# Patient Record
Sex: MALE | Employment: OTHER | URBAN - METROPOLITAN AREA
[De-identification: names, ages, dates, MRNs, and addresses within clinical notes are randomized per-mention and may not be internally consistent; named-entity substitution may affect disease eponyms.]

---

## 2017-03-16 ENCOUNTER — HOSPITAL ENCOUNTER (OUTPATIENT)
Dept: RADIATION THERAPY | Age: 72
Discharge: HOME | End: 2017-03-16

## 2017-09-28 ENCOUNTER — HOSPITAL ENCOUNTER (OUTPATIENT)
Dept: RADIATION THERAPY | Age: 72
Discharge: HOME | End: 2017-09-28

## 2017-10-26 ENCOUNTER — HOSPITAL ENCOUNTER (OUTPATIENT)
Dept: RADIATION THERAPY | Age: 72
Discharge: HOME | End: 2017-10-26

## 2018-04-13 ENCOUNTER — HOSPITAL ENCOUNTER (OUTPATIENT)
Dept: RADIATION THERAPY | Age: 73
Discharge: HOME | End: 2018-04-13

## 2019-01-24 ENCOUNTER — HOSPITAL ENCOUNTER (OUTPATIENT)
Dept: RADIATION THERAPY | Age: 74
Discharge: HOME | End: 2019-01-24

## 2019-10-22 ENCOUNTER — HOSPITAL ENCOUNTER (OUTPATIENT)
Dept: RADIATION THERAPY | Age: 74
Discharge: HOME | End: 2019-10-22

## 2020-04-30 ENCOUNTER — HOSPITAL ENCOUNTER (OUTPATIENT)
Dept: RADIATION THERAPY | Age: 75
Discharge: HOME | End: 2020-04-30

## 2024-02-07 ENCOUNTER — HOSPITAL ENCOUNTER (EMERGENCY)
Facility: HOSPITAL | Age: 79
Discharge: HOME | End: 2024-02-07
Attending: EMERGENCY MEDICINE | Admitting: EMERGENCY MEDICINE
Payer: MEDICARE

## 2024-02-07 ENCOUNTER — APPOINTMENT (OUTPATIENT)
Facility: HOSPITAL | Age: 79
End: 2024-02-07
Payer: MEDICARE

## 2024-02-07 VITALS
WEIGHT: 235 LBS | HEIGHT: 70 IN | DIASTOLIC BLOOD PRESSURE: 81 MMHG | OXYGEN SATURATION: 98 % | BODY MASS INDEX: 33.64 KG/M2 | HEART RATE: 86 BPM | SYSTOLIC BLOOD PRESSURE: 151 MMHG | TEMPERATURE: 98.2 F | RESPIRATION RATE: 18 BRPM

## 2024-02-07 DIAGNOSIS — L03.116 CELLULITIS OF LEFT LOWER EXTREMITY: Primary | ICD-10-CM

## 2024-02-07 LAB — ECHO BSA: 2.29 M2

## 2024-02-07 PROCEDURE — 93971 EXTREMITY STUDY: CPT

## 2024-02-07 PROCEDURE — 99284 EMERGENCY DEPT VISIT MOD MDM: CPT

## 2024-02-07 RX ORDER — FAMOTIDINE 20 MG/1
20 TABLET, FILM COATED ORAL 2 TIMES DAILY
COMMUNITY

## 2024-02-07 RX ORDER — GABAPENTIN 300 MG/1
300 CAPSULE ORAL 3 TIMES DAILY
COMMUNITY

## 2024-02-07 RX ORDER — ACYCLOVIR 200 MG/1
CAPSULE ORAL
COMMUNITY

## 2024-02-07 RX ORDER — TRAZODONE HYDROCHLORIDE 50 MG/1
50 TABLET ORAL NIGHTLY
COMMUNITY

## 2024-02-07 RX ORDER — CEPHALEXIN 500 MG/1
500 CAPSULE ORAL 3 TIMES DAILY
Qty: 15 CAPSULE | Refills: 0 | Status: SHIPPED | OUTPATIENT
Start: 2024-02-07 | End: 2024-02-12

## 2024-02-07 RX ORDER — IBUPROFEN 800 MG/1
800 TABLET ORAL EVERY 8 HOURS PRN
Qty: 30 TABLET | Refills: 0 | Status: SHIPPED | OUTPATIENT
Start: 2024-02-07 | End: 2024-03-08

## 2024-02-07 RX ORDER — ACETAMINOPHEN 500 MG
1000 TABLET ORAL
Qty: 60 TABLET | Refills: 0 | Status: SHIPPED | OUTPATIENT
Start: 2024-02-07 | End: 2024-03-08

## 2024-02-07 RX ORDER — RIVAROXABAN 10 MG/1
10 TABLET, FILM COATED ORAL
COMMUNITY

## 2024-02-07 RX ORDER — LISINOPRIL 20 MG/1
20 TABLET ORAL DAILY
COMMUNITY

## 2024-02-07 ASSESSMENT — PAIN SCALES - GENERAL: PAINLEVEL_OUTOF10: 5

## 2024-02-07 ASSESSMENT — LIFESTYLE VARIABLES
HOW MANY STANDARD DRINKS CONTAINING ALCOHOL DO YOU HAVE ON A TYPICAL DAY: 1 OR 2
HOW OFTEN DO YOU HAVE A DRINK CONTAINING ALCOHOL: 2-4 TIMES A MONTH

## 2024-02-07 ASSESSMENT — PAIN DESCRIPTION - PAIN TYPE: TYPE: ACUTE PAIN

## 2024-02-07 ASSESSMENT — PAIN - FUNCTIONAL ASSESSMENT: PAIN_FUNCTIONAL_ASSESSMENT: 0-10

## 2024-02-07 ASSESSMENT — PAIN DESCRIPTION - LOCATION: LOCATION: LEG

## 2024-02-07 NOTE — ED TRIAGE NOTES
To ED with c/o pain and redness to left calf on set last night before bed.  Has a history of blood clots to left leg and is on Xarelto.  Recent history of Multiple Myeloma and is receiving chemotherapy.

## 2024-02-07 NOTE — ED PROVIDER NOTES
PCP    Schedule an appointment as soon as possible for a visit       [facility]  [address] [address] [address] [phone]  Schedule an appointment as soon as possible for a visit   As needed, To establish primary care      DISCHARGE MEDICATIONS:  Discharge Medication List as of 2/7/2024  8:10 PM        START taking these medications    Details   cephALEXin (KEFLEX) 500 MG capsule Take 1 capsule by mouth 3 times daily for 5 days, Disp-15 capsule, R-0Normal      acetaminophen (TYLENOL) 500 MG tablet Take 2 tablets by mouth every 6-8 hours as needed for Pain, Disp-60 tablet, R-0Normal      ibuprofen (ADVIL;MOTRIN) 800 MG tablet Take 1 tablet by mouth every 8 hours as needed for Pain, Disp-30 tablet, R-0Normal               (Please note that portions of this note were completed with a voice recognition program.  Efforts were made to edit the dictations but occasionally words are mis-transcribed.)    Sima Rhoades PA-C (electronically signed)  Emergency Attending Physician / Physician Assistant / Nurse Practitioner    Presentation, management, and disposition were discussed with the attending physician, Dr. Lazcano, who is in agreement with plan of care.               Sima Rhoades PA-C  02/07/24 2018

## 2024-02-08 NOTE — DISCHARGE INSTRUCTIONS
As discussed, your workup today is negative for any emergent findings. This is likely cellulitis (skin infection). There is no blood clot on US. Follow up with your PCP for further management. Return to the ER if you experience severe or worsening symptoms.

## 2024-02-08 NOTE — ED NOTES
Pt given discharge instructions, patient education, prescriptions, and follow up information. Pt verbalizes understanding. All questions answered. Patient discharged to home in private vehicle, ambulatory. Pt A&Ox4, RA, pain controlled.

## 2024-12-03 ENCOUNTER — HOSPITAL ENCOUNTER (OUTPATIENT)
Facility: HOSPITAL | Age: 79
Setting detail: SPECIMEN
Discharge: HOME | End: 2024-12-06

## 2025-03-23 ENCOUNTER — HOSPITAL ENCOUNTER (INPATIENT)
Facility: HOSPITAL | Age: 80
LOS: 3 days | Discharge: ANOTHER HOSPITAL | DRG: 840 | End: 2025-03-26
Attending: EMERGENCY MEDICINE | Admitting: STUDENT IN AN ORGANIZED HEALTH CARE EDUCATION/TRAINING PROGRAM
Payer: MEDICARE

## 2025-03-23 DIAGNOSIS — R53.1 GENERAL WEAKNESS: ICD-10-CM

## 2025-03-23 DIAGNOSIS — N39.0 URINARY TRACT INFECTION WITH HEMATURIA, SITE UNSPECIFIED: ICD-10-CM

## 2025-03-23 DIAGNOSIS — R31.9 URINARY TRACT INFECTION WITH HEMATURIA, SITE UNSPECIFIED: ICD-10-CM

## 2025-03-23 DIAGNOSIS — R33.9 URINARY RETENTION: Primary | ICD-10-CM

## 2025-03-23 LAB
ALBUMIN SERPL-MCNC: 2.8 G/DL (ref 3.5–5)
ALBUMIN/GLOB SERPL: 0.7 (ref 1.1–2.2)
ALP SERPL-CCNC: 104 U/L (ref 45–117)
ALT SERPL-CCNC: 38 U/L (ref 12–78)
ANION GAP SERPL CALC-SCNC: 7 MMOL/L (ref 2–12)
APPEARANCE UR: CLEAR
AST SERPL-CCNC: 37 U/L (ref 15–37)
BACTERIA URNS QL MICRO: NEGATIVE /HPF
BASOPHILS # BLD: 0.03 K/UL (ref 0–0.1)
BASOPHILS NFR BLD: 0.4 % (ref 0–1)
BILIRUB SERPL-MCNC: 0.6 MG/DL (ref 0.2–1)
BILIRUB UR QL: NEGATIVE
BUN SERPL-MCNC: 24 MG/DL (ref 6–20)
BUN/CREAT SERPL: 27 (ref 12–20)
CALCIUM SERPL-MCNC: 9.7 MG/DL (ref 8.5–10.1)
CHLORIDE SERPL-SCNC: 105 MMOL/L (ref 97–108)
CK SERPL-CCNC: 66 U/L (ref 39–308)
CO2 SERPL-SCNC: 26 MMOL/L (ref 21–32)
COLOR UR: ABNORMAL
CREAT SERPL-MCNC: 0.89 MG/DL (ref 0.7–1.3)
DIFFERENTIAL METHOD BLD: ABNORMAL
EOSINOPHIL # BLD: 0.32 K/UL (ref 0–0.4)
EOSINOPHIL NFR BLD: 3.8 % (ref 0–7)
EPITH CASTS URNS QL MICRO: ABNORMAL /LPF
ERYTHROCYTE [DISTWIDTH] IN BLOOD BY AUTOMATED COUNT: 13.8 % (ref 11.5–14.5)
ERYTHROCYTE [SEDIMENTATION RATE] IN BLOOD: 128 MM/HR (ref 0–20)
GLOBULIN SER CALC-MCNC: 4.3 G/DL (ref 2–4)
GLUCOSE SERPL-MCNC: 108 MG/DL (ref 65–100)
GLUCOSE UR STRIP.AUTO-MCNC: NEGATIVE MG/DL
HCT VFR BLD AUTO: 28.8 % (ref 36.6–50.3)
HGB BLD-MCNC: 9.2 G/DL (ref 12.1–17)
HGB UR QL STRIP: ABNORMAL
HYALINE CASTS URNS QL MICRO: ABNORMAL /LPF (ref 0–2)
IMM GRANULOCYTES # BLD AUTO: 0.05 K/UL (ref 0–0.04)
IMM GRANULOCYTES NFR BLD AUTO: 0.6 % (ref 0–0.5)
KETONES UR QL STRIP.AUTO: NEGATIVE MG/DL
LEUKOCYTE ESTERASE UR QL STRIP.AUTO: ABNORMAL
LYMPHOCYTES # BLD: 0.52 K/UL (ref 0.8–3.5)
LYMPHOCYTES NFR BLD: 6.2 % (ref 12–49)
MCH RBC QN AUTO: 30 PG (ref 26–34)
MCHC RBC AUTO-ENTMCNC: 31.9 G/DL (ref 30–36.5)
MCV RBC AUTO: 93.8 FL (ref 80–99)
MONOCYTES # BLD: 0.56 K/UL (ref 0–1)
MONOCYTES NFR BLD: 6.7 % (ref 5–13)
NEUTS SEG # BLD: 6.92 K/UL (ref 1.8–8)
NEUTS SEG NFR BLD: 82.3 % (ref 32–75)
NITRITE UR QL STRIP.AUTO: NEGATIVE
NRBC # BLD: 0 K/UL (ref 0–0.01)
NRBC BLD-RTO: 0 PER 100 WBC
PH UR STRIP: 6.5 (ref 5–8)
PLATELET # BLD AUTO: 324 K/UL (ref 150–400)
PMV BLD AUTO: 9 FL (ref 8.9–12.9)
POTASSIUM SERPL-SCNC: 4 MMOL/L (ref 3.5–5.1)
PROT SERPL-MCNC: 7.1 G/DL (ref 6.4–8.2)
PROT UR STRIP-MCNC: 100 MG/DL
RBC # BLD AUTO: 3.07 M/UL (ref 4.1–5.7)
RBC #/AREA URNS HPF: >100 /HPF (ref 0–5)
RBC MORPH BLD: ABNORMAL
SODIUM SERPL-SCNC: 138 MMOL/L (ref 136–145)
SP GR UR REFRACTOMETRY: 1.02 (ref 1–1.03)
SPECIMEN HOLD: NORMAL
UROBILINOGEN UR QL STRIP.AUTO: 1 EU/DL (ref 0.2–1)
WBC # BLD AUTO: 8.4 K/UL (ref 4.1–11.1)
WBC URNS QL MICRO: ABNORMAL /HPF (ref 0–4)

## 2025-03-23 PROCEDURE — 85025 COMPLETE CBC W/AUTO DIFF WBC: CPT

## 2025-03-23 PROCEDURE — 2500000003 HC RX 250 WO HCPCS: Performed by: INTERNAL MEDICINE

## 2025-03-23 PROCEDURE — 2580000003 HC RX 258: Performed by: INTERNAL MEDICINE

## 2025-03-23 PROCEDURE — 94761 N-INVAS EAR/PLS OXIMETRY MLT: CPT

## 2025-03-23 PROCEDURE — 51702 INSERT TEMP BLADDER CATH: CPT

## 2025-03-23 PROCEDURE — 82550 ASSAY OF CK (CPK): CPT

## 2025-03-23 PROCEDURE — 1100000000 HC RM PRIVATE

## 2025-03-23 PROCEDURE — 81001 URINALYSIS AUTO W/SCOPE: CPT

## 2025-03-23 PROCEDURE — 80053 COMPREHEN METABOLIC PANEL: CPT

## 2025-03-23 PROCEDURE — 6360000002 HC RX W HCPCS: Performed by: INTERNAL MEDICINE

## 2025-03-23 PROCEDURE — 36415 COLL VENOUS BLD VENIPUNCTURE: CPT

## 2025-03-23 PROCEDURE — G0378 HOSPITAL OBSERVATION PER HR: HCPCS

## 2025-03-23 PROCEDURE — 99285 EMERGENCY DEPT VISIT HI MDM: CPT

## 2025-03-23 PROCEDURE — 85652 RBC SED RATE AUTOMATED: CPT

## 2025-03-23 PROCEDURE — 6370000000 HC RX 637 (ALT 250 FOR IP): Performed by: INTERNAL MEDICINE

## 2025-03-23 RX ORDER — LISINOPRIL 20 MG/1
20 TABLET ORAL DAILY
Status: DISCONTINUED | OUTPATIENT
Start: 2025-03-24 | End: 2025-03-27 | Stop reason: HOSPADM

## 2025-03-23 RX ORDER — ACETAMINOPHEN 650 MG/1
650 SUPPOSITORY RECTAL EVERY 6 HOURS PRN
Status: DISCONTINUED | OUTPATIENT
Start: 2025-03-23 | End: 2025-03-27 | Stop reason: HOSPADM

## 2025-03-23 RX ORDER — ACYCLOVIR 200 MG/1
200 CAPSULE ORAL 2 TIMES DAILY
Status: DISCONTINUED | OUTPATIENT
Start: 2025-03-23 | End: 2025-03-27 | Stop reason: HOSPADM

## 2025-03-23 RX ORDER — ONDANSETRON 4 MG/1
4 TABLET, ORALLY DISINTEGRATING ORAL EVERY 8 HOURS PRN
Status: DISCONTINUED | OUTPATIENT
Start: 2025-03-23 | End: 2025-03-27 | Stop reason: HOSPADM

## 2025-03-23 RX ORDER — SODIUM CHLORIDE 0.9 % (FLUSH) 0.9 %
5-40 SYRINGE (ML) INJECTION PRN
Status: DISCONTINUED | OUTPATIENT
Start: 2025-03-23 | End: 2025-03-27 | Stop reason: HOSPADM

## 2025-03-23 RX ORDER — ACETAMINOPHEN 500 MG
45 TABLET ORAL 2 TIMES DAILY
COMMUNITY

## 2025-03-23 RX ORDER — GABAPENTIN 300 MG/1
300 CAPSULE ORAL 2 TIMES DAILY
Status: DISCONTINUED | OUTPATIENT
Start: 2025-03-23 | End: 2025-03-23

## 2025-03-23 RX ORDER — ROSUVASTATIN CALCIUM 10 MG/1
20 TABLET, COATED ORAL NIGHTLY
Status: DISCONTINUED | OUTPATIENT
Start: 2025-03-23 | End: 2025-03-27 | Stop reason: HOSPADM

## 2025-03-23 RX ORDER — OXYMETAZOLINE HYDROCHLORIDE 0.05 G/100ML
2 SPRAY NASAL 2 TIMES DAILY
COMMUNITY

## 2025-03-23 RX ORDER — GABAPENTIN 300 MG/1
300 CAPSULE ORAL DAILY
Status: DISCONTINUED | OUTPATIENT
Start: 2025-03-23 | End: 2025-03-27 | Stop reason: HOSPADM

## 2025-03-23 RX ORDER — ROSUVASTATIN CALCIUM 20 MG/1
20 TABLET, COATED ORAL NIGHTLY
COMMUNITY

## 2025-03-23 RX ORDER — FEXOFENADINE HCL 180 MG/1
180 TABLET ORAL DAILY PRN
COMMUNITY

## 2025-03-23 RX ORDER — POLYETHYLENE GLYCOL 3350 17 G/17G
17 POWDER, FOR SOLUTION ORAL DAILY PRN
Status: DISCONTINUED | OUTPATIENT
Start: 2025-03-23 | End: 2025-03-27 | Stop reason: HOSPADM

## 2025-03-23 RX ORDER — GUAIFENESIN 600 MG/1
1200 TABLET, EXTENDED RELEASE ORAL 2 TIMES DAILY
COMMUNITY

## 2025-03-23 RX ORDER — SODIUM CHLORIDE 9 MG/ML
INJECTION, SOLUTION INTRAVENOUS PRN
Status: DISCONTINUED | OUTPATIENT
Start: 2025-03-23 | End: 2025-03-27 | Stop reason: HOSPADM

## 2025-03-23 RX ORDER — SULFAMETHOXAZOLE AND TRIMETHOPRIM 800; 160 MG/1; MG/1
1 TABLET ORAL 2 TIMES DAILY
Qty: 14 TABLET | Refills: 0 | Status: SHIPPED | OUTPATIENT
Start: 2025-03-23 | End: 2025-03-30

## 2025-03-23 RX ORDER — SODIUM CHLORIDE 9 MG/ML
INJECTION, SOLUTION INTRAVENOUS CONTINUOUS
Status: DISCONTINUED | OUTPATIENT
Start: 2025-03-23 | End: 2025-03-24

## 2025-03-23 RX ORDER — ACETAMINOPHEN 325 MG/1
650 TABLET ORAL EVERY 6 HOURS PRN
Status: DISCONTINUED | OUTPATIENT
Start: 2025-03-23 | End: 2025-03-27 | Stop reason: HOSPADM

## 2025-03-23 RX ORDER — M-VIT,TX,IRON,MINS/CALC/FOLIC 27MG-0.4MG
1 TABLET ORAL 2 TIMES DAILY
COMMUNITY

## 2025-03-23 RX ORDER — ONDANSETRON 2 MG/ML
4 INJECTION INTRAMUSCULAR; INTRAVENOUS EVERY 6 HOURS PRN
Status: DISCONTINUED | OUTPATIENT
Start: 2025-03-23 | End: 2025-03-27 | Stop reason: HOSPADM

## 2025-03-23 RX ORDER — GABAPENTIN 300 MG/1
300 CAPSULE ORAL
Status: DISCONTINUED | OUTPATIENT
Start: 2025-03-23 | End: 2025-03-27 | Stop reason: HOSPADM

## 2025-03-23 RX ORDER — SODIUM CHLORIDE 0.9 % (FLUSH) 0.9 %
5-40 SYRINGE (ML) INJECTION EVERY 12 HOURS SCHEDULED
Status: DISCONTINUED | OUTPATIENT
Start: 2025-03-23 | End: 2025-03-27 | Stop reason: HOSPADM

## 2025-03-23 RX ADMIN — GABAPENTIN 300 MG: 300 CAPSULE ORAL at 17:42

## 2025-03-23 RX ADMIN — SODIUM CHLORIDE, PRESERVATIVE FREE 5 ML: 5 INJECTION INTRAVENOUS at 21:00

## 2025-03-23 RX ADMIN — ROSUVASTATIN CALCIUM 20 MG: 10 TABLET, FILM COATED ORAL at 20:59

## 2025-03-23 RX ADMIN — GABAPENTIN 400 MG: 300 CAPSULE ORAL at 20:59

## 2025-03-23 RX ADMIN — ACYCLOVIR 200 MG: 200 CAPSULE ORAL at 21:00

## 2025-03-23 RX ADMIN — WATER 1000 MG: 1 INJECTION INTRAMUSCULAR; INTRAVENOUS; SUBCUTANEOUS at 16:02

## 2025-03-23 RX ADMIN — SODIUM CHLORIDE: 0.9 INJECTION, SOLUTION INTRAVENOUS at 17:44

## 2025-03-23 RX ADMIN — SODIUM CHLORIDE: 0.9 INJECTION, SOLUTION INTRAVENOUS at 16:55

## 2025-03-23 ASSESSMENT — PAIN - FUNCTIONAL ASSESSMENT: PAIN_FUNCTIONAL_ASSESSMENT: NONE - DENIES PAIN

## 2025-03-23 ASSESSMENT — LIFESTYLE VARIABLES
HOW MANY STANDARD DRINKS CONTAINING ALCOHOL DO YOU HAVE ON A TYPICAL DAY: PATIENT DOES NOT DRINK
HOW OFTEN DO YOU HAVE A DRINK CONTAINING ALCOHOL: NEVER

## 2025-03-23 NOTE — CONSULTS
New Urology Consult Note    Patient: Edil Wong MRN: [id number]  SSN: [id number]    YOB: 1945  Age: 79 y.o.  Sex: male            Assessment:     Edil Wong is a 79 y.o. male with history of prostate cancer status post prostatectomy and radiation in 2015, recent hx of gross hematuria requiring cysto, clot evac and fulguration on 3/21/25 at [facility].  Presented to ED with complaints of uncontrolled incontinence and weakness.     On assessment nad, patient reports uncontrolled incontinence and bladder spasms prompting ED visit. Bladder spasms have resolved following FC placement. Denies gross hematuria, dysuria,flank pain or difficulty voiding. FC in place, clear mildly pink tinged urine noted in proximal tubing.    VSS, afebrile  No leukocytosis, cr- 0.89, hgb- 9.2  UA not c/w UTI, + 100 RBCs  No  imaging to review      Recommendations:     Gross Hematuria  Incontinence  Clear, mildly pink tinged urine on exam, no indications for intervention  - maintain FC, hydrate, hold AC if hematuria worsens  - discussed maintaining FC at DC to assist with incontinence vs incontinence wear, with further eval on outpatient basis. Will decide timing of VT inpatient vs outpatient closer to DC.    Will see in am     Thank you for this consult. Please contact [facility] with any further questions/concerns.             Subjective     Past Medical History  No past medical history on file.    Past Surgical History:   No past surgical history on file.    Medication:  Current Facility-Administered Medications   Medication Dose Route Frequency Provider Last Rate Last Admin    cefTRIAXone (ROCEPHIN) 1,000 mg in sterile water 10 mL IV syringe  1,000 mg IntraVENous Q24H Aniya Florence MD   1,000 mg at 03/23/25 1602    sodium chloride flush 0.9 % injection 5-40 mL  5-40 mL IntraVENous 2 times per day Aniya Florence MD        sodium chloride flush 0.9 % injection 5-40 mL

## 2025-03-23 NOTE — ED PROVIDER NOTES
Course User Index  [KR] Amparo Jackman, APRN - NP         CONSULTS:  None    PROCEDURES:     Procedures    Labs Reviewed   CBC WITH AUTO DIFFERENTIAL - Abnormal; Notable for the following components:       Result Value    RBC 3.07 (*)     Hemoglobin 9.2 (*)     Hematocrit 28.8 (*)     Neutrophils % 82.3 (*)     Lymphocytes % 6.2 (*)     Immature Granulocytes % 0.6 (*)     Lymphocytes Absolute 0.52 (*)     Immature Granulocytes Absolute 0.05 (*)     All other components within normal limits   COMPREHENSIVE METABOLIC PANEL - Abnormal; Notable for the following components:    Glucose 108 (*)     BUN 24 (*)     BUN/Creatinine Ratio 27 (*)     Albumin 2.8 (*)     Globulin 4.3 (*)     Albumin/Globulin Ratio 0.7 (*)     All other components within normal limits   URINALYSIS WITH MICROSCOPIC - Abnormal; Notable for the following components:    Protein,  (*)     Blood, Urine LARGE (*)     Leukocyte Esterase, Urine SMALL (*)     RBC, UA >100 (*)     All other components within normal limits   URINE CULTURE HOLD SAMPLE     No orders to display     Perfect Serve Consult for Admission  2:36 PM    ED Room Number: ER21/21  Patient Name and age:  Edil Wong 79 y.o.  male  Working Diagnosis:   1. Urinary retention    2. Urinary tract infection with hematuria, site unspecified    3. General weakness        COVID-19 Suspicion: No  Sepsis present:  No  Reassessment needed: No  Code Status:  Full Code  Readmission: No  Isolation Requirements: no  Recommended Level of Care: telemetry  Department: [facility] - [phone]    Other:  This is an 79-year-old male who presents to the emergency room with complaints of urinary incontinence.  He is escorted by his daughter.  Patient had a recent hospitalization to [facility] due to urinary incontinence and questionable obstruction.  Had a Carr catheter placed and had a procedure by urology to irrigate the Carr secondary to blood clots.  Daughter is unsure the name of the  procedure.  Patient was discharged to home on Friday but had his Carr catheter removed 30 minutes prior to leaving the hospital.  Since then he has been placed on p.o. antibiotics of which she has not taken as of yet.  Unsure what the antibiotics name is.  Patient with no other symptoms including chest pain, nausea or vomiting, fevers or chills.  No shortness of breath or dizziness. They did speak with urology who states to come to the emergency room with concerns for obstruction versus infection.  Of note patient has increased fatigue and weakness.       (Please note that portions of this note were completed with a voice recognition program.  Efforts were made to edit the dictations but occasionally words are mis-transcribed.)             Amparo Jackman, APRN - NP  03/23/25 [id number]

## 2025-03-23 NOTE — ED TRIAGE NOTES
Pt arrives to ED via POV with c/o urinary incontinence, urgency, and frequency for 2-3 days. Pt recently admitted to [facility] for blood in urine and incontinence. Had tesfaye placed and procedure to irrigate tesfaye due to blood clots and had hgb drop. Tesfaye removed yesterday and symptoms began after D/C.    Endorses weakness and feeling uncoordinated    MARIA DEL CARMEN Jackman in triage

## 2025-03-23 NOTE — H&P
[facility]  [address] [place]  [address] [phone]    Admission History and Physical      NAME:  Edil Wong   :   1945   MRN:  [id number]     PCP:  None, None     Date/Time:  3/23/2025         Subjective:     CHIEF COMPLAINT: \"I'm so weak\"     HISTORY OF PRESENT ILLNESS:     Mr. Wong is a 79 y.o.   male with PMH of HTN, XOL and neuropathy admitted for weakness and urinary discomfort. Per pt was recently hospitalized at [facility] for hematuria and required CBI. Reportedly discharged prior to VT completion. Daughter also noted some weakness there but did not complete PT/OT eval prior to d/c and previously has been limited by CBI    Pt denies back pain, saddle anesthesia    PMH  HTN  XOL     No past surgical history on file.    Social History     Tobacco Use    Smoking status: Former     Types: Cigarettes    Smokeless tobacco: Not on file   Substance Use Topics    Alcohol use: Not on file      FH   XOL    No Known Allergies     Prior to Admission medications    Medication Sig Start Date End Date Taking? Authorizing Provider   sulfamethoxazole-trimethoprim (BACTRIM DS;SEPTRA DS) 800-160 MG per tablet Take 1 tablet by mouth 2 times daily for 7 days 3/23/25 3/30/25 Yes Amparo Jackman APRN - NP   oxymetazoline (AFRIN) 0.05 % nasal spray 2 sprays by Nasal route 2 times daily   Yes Brian Parker MD   Ferrous Sulfate Dried ER (SLOW RELEASE IRON) 45 MG TBCR Take 45 mg by mouth in the morning and at bedtime   Yes Brian Parker MD   Multiple Vitamins-Minerals (THERAPEUTIC MULTIVITAMIN-MINERALS) tablet Take 1 tablet by mouth 2 times daily   Yes Brian Parker MD   rosuvastatin (CRESTOR) 20 MG tablet Take 1 tablet by mouth nightly   Yes Brian Parker MD   melatonin 3 MG TABS tablet Take 1 tablet by mouth nightly   Yes Brian Parker MD   fexofenadine (ALLEGRA) 180 MG tablet Take 1 tablet by mouth daily as needed   Yes

## 2025-03-24 LAB
ANION GAP SERPL CALC-SCNC: 7 MMOL/L (ref 2–12)
BASOPHILS # BLD: 0.03 K/UL (ref 0–0.1)
BASOPHILS NFR BLD: 0.6 % (ref 0–1)
BUN SERPL-MCNC: 18 MG/DL (ref 6–20)
BUN/CREAT SERPL: 27 (ref 12–20)
CALCIUM SERPL-MCNC: 8.7 MG/DL (ref 8.5–10.1)
CHLORIDE SERPL-SCNC: 110 MMOL/L (ref 97–108)
CO2 SERPL-SCNC: 24 MMOL/L (ref 21–32)
CREAT SERPL-MCNC: 0.66 MG/DL (ref 0.7–1.3)
DIFFERENTIAL METHOD BLD: ABNORMAL
EOSINOPHIL # BLD: 0.27 K/UL (ref 0–0.4)
EOSINOPHIL NFR BLD: 5.1 % (ref 0–7)
ERYTHROCYTE [DISTWIDTH] IN BLOOD BY AUTOMATED COUNT: 13.8 % (ref 11.5–14.5)
FERRITIN SERPL-MCNC: 789 NG/ML (ref 26–388)
GLUCOSE SERPL-MCNC: 91 MG/DL (ref 65–100)
HCT VFR BLD AUTO: 25.1 % (ref 36.6–50.3)
HEMOCCULT STL QL: NEGATIVE
HGB BLD-MCNC: 8 G/DL (ref 12.1–17)
IMM GRANULOCYTES # BLD AUTO: 0.05 K/UL (ref 0–0.04)
IMM GRANULOCYTES NFR BLD AUTO: 0.9 % (ref 0–0.5)
IRON SATN MFR SERPL: 22 % (ref 20–50)
IRON SERPL-MCNC: 58 UG/DL (ref 35–150)
LYMPHOCYTES # BLD: 0.7 K/UL (ref 0.8–3.5)
LYMPHOCYTES NFR BLD: 13.3 % (ref 12–49)
MAGNESIUM SERPL-MCNC: 2.1 MG/DL (ref 1.6–2.4)
MCH RBC QN AUTO: 30.3 PG (ref 26–34)
MCHC RBC AUTO-ENTMCNC: 31.9 G/DL (ref 30–36.5)
MCV RBC AUTO: 95.1 FL (ref 80–99)
MONOCYTES # BLD: 0.54 K/UL (ref 0–1)
MONOCYTES NFR BLD: 10.2 % (ref 5–13)
NEUTS SEG # BLD: 3.68 K/UL (ref 1.8–8)
NEUTS SEG NFR BLD: 69.9 % (ref 32–75)
NRBC # BLD: 0 K/UL (ref 0–0.01)
NRBC BLD-RTO: 0 PER 100 WBC
PLATELET # BLD AUTO: 276 K/UL (ref 150–400)
PMV BLD AUTO: 9 FL (ref 8.9–12.9)
POTASSIUM SERPL-SCNC: 4 MMOL/L (ref 3.5–5.1)
RBC # BLD AUTO: 2.64 M/UL (ref 4.1–5.7)
SODIUM SERPL-SCNC: 141 MMOL/L (ref 136–145)
TIBC SERPL-MCNC: 264 UG/DL (ref 250–450)
WBC # BLD AUTO: 5.3 K/UL (ref 4.1–11.1)

## 2025-03-24 PROCEDURE — 94761 N-INVAS EAR/PLS OXIMETRY MLT: CPT

## 2025-03-24 PROCEDURE — 97165 OT EVAL LOW COMPLEX 30 MIN: CPT

## 2025-03-24 PROCEDURE — 2500000003 HC RX 250 WO HCPCS: Performed by: INTERNAL MEDICINE

## 2025-03-24 PROCEDURE — 83735 ASSAY OF MAGNESIUM: CPT

## 2025-03-24 PROCEDURE — 85025 COMPLETE CBC W/AUTO DIFF WBC: CPT

## 2025-03-24 PROCEDURE — 82728 ASSAY OF FERRITIN: CPT

## 2025-03-24 PROCEDURE — 1100000000 HC RM PRIVATE

## 2025-03-24 PROCEDURE — 97162 PT EVAL MOD COMPLEX 30 MIN: CPT

## 2025-03-24 PROCEDURE — 6370000000 HC RX 637 (ALT 250 FOR IP): Performed by: INTERNAL MEDICINE

## 2025-03-24 PROCEDURE — 97535 SELF CARE MNGMENT TRAINING: CPT

## 2025-03-24 PROCEDURE — G0378 HOSPITAL OBSERVATION PER HR: HCPCS

## 2025-03-24 PROCEDURE — 36415 COLL VENOUS BLD VENIPUNCTURE: CPT

## 2025-03-24 PROCEDURE — 86235 NUCLEAR ANTIGEN ANTIBODY: CPT

## 2025-03-24 PROCEDURE — 97116 GAIT TRAINING THERAPY: CPT

## 2025-03-24 PROCEDURE — 97530 THERAPEUTIC ACTIVITIES: CPT

## 2025-03-24 PROCEDURE — 82272 OCCULT BLD FECES 1-3 TESTS: CPT

## 2025-03-24 PROCEDURE — 2580000003 HC RX 258: Performed by: INTERNAL MEDICINE

## 2025-03-24 PROCEDURE — 83550 IRON BINDING TEST: CPT

## 2025-03-24 PROCEDURE — 83540 ASSAY OF IRON: CPT

## 2025-03-24 PROCEDURE — 80048 BASIC METABOLIC PNL TOTAL CA: CPT

## 2025-03-24 PROCEDURE — 6360000002 HC RX W HCPCS: Performed by: INTERNAL MEDICINE

## 2025-03-24 RX ORDER — SODIUM CHLORIDE 9 MG/ML
INJECTION, SOLUTION INTRAVENOUS CONTINUOUS
Status: DISCONTINUED | OUTPATIENT
Start: 2025-03-24 | End: 2025-03-24

## 2025-03-24 RX ORDER — CIPROFLOXACIN 500 MG/1
500 TABLET, FILM COATED ORAL 2 TIMES DAILY
Status: DISCONTINUED | OUTPATIENT
Start: 2025-03-24 | End: 2025-03-27 | Stop reason: HOSPADM

## 2025-03-24 RX ADMIN — ROSUVASTATIN CALCIUM 20 MG: 10 TABLET, FILM COATED ORAL at 22:08

## 2025-03-24 RX ADMIN — ACETAMINOPHEN 650 MG: 325 TABLET ORAL at 09:35

## 2025-03-24 RX ADMIN — SODIUM CHLORIDE: 0.9 INJECTION, SOLUTION INTRAVENOUS at 06:01

## 2025-03-24 RX ADMIN — GABAPENTIN 300 MG: 300 CAPSULE ORAL at 13:12

## 2025-03-24 RX ADMIN — GABAPENTIN 400 MG: 300 CAPSULE ORAL at 22:08

## 2025-03-24 RX ADMIN — CIPROFLOXACIN HYDROCHLORIDE 500 MG: 500 TABLET, FILM COATED ORAL at 22:08

## 2025-03-24 RX ADMIN — IRON SUCROSE 200 MG: 20 INJECTION, SOLUTION INTRAVENOUS at 13:12

## 2025-03-24 RX ADMIN — GABAPENTIN 300 MG: 300 CAPSULE ORAL at 09:29

## 2025-03-24 RX ADMIN — SODIUM CHLORIDE, PRESERVATIVE FREE 10 ML: 5 INJECTION INTRAVENOUS at 22:08

## 2025-03-24 RX ADMIN — ACYCLOVIR 200 MG: 200 CAPSULE ORAL at 22:08

## 2025-03-24 RX ADMIN — CIPROFLOXACIN HYDROCHLORIDE 500 MG: 500 TABLET, FILM COATED ORAL at 13:13

## 2025-03-24 RX ADMIN — ACYCLOVIR 200 MG: 200 CAPSULE ORAL at 09:29

## 2025-03-24 ASSESSMENT — PAIN DESCRIPTION - LOCATION: LOCATION: OTHER (COMMENT)

## 2025-03-24 ASSESSMENT — PAIN SCALES - GENERAL: PAINLEVEL_OUTOF10: 5

## 2025-03-24 ASSESSMENT — PAIN DESCRIPTION - DESCRIPTORS: DESCRIPTORS: SORE

## 2025-03-24 ASSESSMENT — PAIN DESCRIPTION - ORIENTATION: ORIENTATION: LEFT

## 2025-03-24 NOTE — PROGRESS NOTES
Eosinophils % 3.8 0.0 - 7.0 %    Basophils % 0.4 0.0 - 1.0 %    Immature Granulocytes % 0.6 (H) 0.0 - 0.5 %    Neutrophils Absolute 6.92 1.80 - 8.00 K/UL    Lymphocytes Absolute 0.52 (L) 0.80 - 3.50 K/UL    Monocytes Absolute 0.56 0.00 - 1.00 K/UL    Eosinophils Absolute 0.32 0.00 - 0.40 K/UL    Basophils Absolute 0.03 0.00 - 0.10 K/UL    Immature Granulocytes Absolute 0.05 (H) 0.00 - 0.04 K/UL    Differential Type SMEAR SCANNED      RBC Comment NORMOCYTIC, NORMOCHROMIC     Comprehensive Metabolic Panel    Collection Time: 03/23/25 11:11 AM   Result Value Ref Range    Sodium 138 136 - 145 mmol/L    Potassium 4.0 3.5 - 5.1 mmol/L    Chloride 105 97 - 108 mmol/L    CO2 26 21 - 32 mmol/L    Anion Gap 7 2 - 12 mmol/L    Glucose 108 (H) 65 - 100 mg/dL    BUN 24 (H) 6 - 20 MG/DL    Creatinine 0.89 0.70 - 1.30 MG/DL    BUN/Creatinine Ratio 27 (H) 12 - 20      Est, Glom Filt Rate 86 >60 ml/min/1.73m2    Calcium 9.7 8.5 - 10.1 MG/DL    Total Bilirubin 0.6 0.2 - 1.0 MG/DL    ALT 38 12 - 78 U/L    AST 37 15 - 37 U/L    Alk Phosphatase 104 45 - 117 U/L    Total Protein 7.1 6.4 - 8.2 g/dL    Albumin 2.8 (L) 3.5 - 5.0 g/dL    Globulin 4.3 (H) 2.0 - 4.0 g/dL    Albumin/Globulin Ratio 0.7 (L) 1.1 - 2.2     Urinalysis with Microscopic    Collection Time: 03/23/25 11:56 AM   Result Value Ref Range    Color, UA YELLOW/STRAW      Appearance CLEAR CLEAR      Specific Gravity, UA 1.017 1.003 - 1.030      pH, Urine 6.5 5.0 - 8.0      Protein,  (A) NEG mg/dL    Glucose, Ur Negative NEG mg/dL    Ketones, Urine Negative NEG mg/dL    Bilirubin, Urine Negative NEG      Blood, Urine LARGE (A) NEG      Urobilinogen, Urine 1.0 0.2 - 1.0 EU/dL    Nitrite, Urine Negative NEG      Leukocyte Esterase, Urine SMALL (A) NEG      WBC, UA 10-20 0 - 4 /hpf    RBC, UA >100 (H) 0 - 5 /hpf    Epithelial Cells, UA FEW FEW /lpf    BACTERIA, URINE Negative NEG /hpf    Hyaline Casts, UA 2-5 0 - 2 /lpf   Urine Culture Hold Sample    Collection Time: 03/23/25  Time: 03/24/25  5:58 AM   Result Value Ref Range    Magnesium 2.1 1.6 - 2.4 mg/dL       Imaging:  No results found.    No results found.    No orders to display         Signed By: MOHIT Alxeis - MARIA DEL CARMEN - March 24, 2025

## 2025-03-24 NOTE — PROGRESS NOTES
New patient PCP appointment set for Monday May 19th 2025 at 3:30 PM with Doctor Case at their [place] location. Office asks for patient to arrive 15 mins early,bring picture ID/Insurance cards and list of current medications.

## 2025-03-24 NOTE — PLAN OF CARE
Problem: Occupational Therapy - Adult  Goal: By Discharge: Performs self-care activities at highest level of function for planned discharge setting.  See evaluation for individualized goals.  Description: FUNCTIONAL STATUS PRIOR TO ADMISSION:  Pt was independent and active.  Ambulated with no AD and still driving.  Receives Help From: Family, Neighbor, Prior Level of Assist for ADLs: Independent,  ,  ,  ,  ,  , Prior Level of Assist for Homemaking: Independent,  , Prior Level of Assist for Transfers: Independent, Active : Yes     HOME SUPPORT: Patient lived alone with lots of supportive family in the area.    Occupational Therapy Goals:  Initiated 3/24/2025  1.  Patient will perform lower body dressing with Supervision within 7 day(s).  2.  Patient will perform bathing with Stand by Assist within 7 day(s).  3.  Patient will perform grooming standing at sink with Contact Guard Assist within 7 day(s).  4.  Patient will perform toilet transfers with Supervision  within 7 day(s).  5.  Patient will perform all aspects of toileting with Supervision within 7 day(s).  6.  Patient will participate in upper extremity therapeutic exercise/activities with Supervision for 10 minutes within 7 day(s).    7.  Patient will utilize energy conservation techniques during functional activities with verbal cues within 7 day(s).   Outcome: Progressing   OCCUPATIONAL THERAPY EVALUATION    Patient: Edil Wong (79 y.o. male)  Date: 3/24/2025  Primary Diagnosis: Urinary retention [R33.9]  UTI (urinary tract infection) [N39.0]  General weakness [R53.1]  Urinary tract infection with hematuria, site unspecified [N39.0, R31.9]         Precautions: Fall Risk                  ASSESSMENT :  The patient is limited by decreased functional mobility, independence in ADLs, high-level IADLs, strength, body mechanics, activity tolerance, endurance, safety awareness, balance and fear of falling.    Based on the impairments listed above pt  activated    COMMUNICATION/EDUCATION:   The patient's plan of care was discussed with: physical therapist    Patient Education  Education Given To: Patient;Family  Education Provided: Role of Therapy;Plan of Care;Precautions;Transfer Training;Fall Prevention Strategies;Mobility Training  Education Method: Verbal;Demonstration  Barriers to Learning: None  Education Outcome: Verbalized understanding;Demonstrated understanding;Continued education needed    Thank you for this referral.  Alina Gonzalez OT  Minutes: 26    Occupational Therapy Evaluation Charge Determination   History Examination Decision-Making   LOW Complexity : Brief history review  LOW Complexity LOW Complexity: No comorbidities that affect functional and  no verbal  or physical assist needed to complete eval tasks   Based on the above components, the patient evaluation is determined to be of the following complexity level: Low

## 2025-03-24 NOTE — PROGRESS NOTES
[facility]  [address] [place] [address] [phone]        Hospitalist Progress Note      NAME: Edil Wong   :  1945  MRM:  [id number]    Date/Time: 3/24/2025  1:38 PM         Subjective:     Chief Complaint:  \"I'm okay\"     Pt seen and examined. Carr in place. Urine appears yellow. No complaints. Plan discussed with family. PT/OT recommending IPR     ROS:  (bold if positive, if negative)            Objective:       Vitals:          Last 24hrs VS reviewed since prior progress note. Most recent are:    Vitals:    25 0902   BP: 119/71   Pulse: 82   Resp: 16   Temp: 98.4 °F (36.9 °C)   SpO2: 96%     SpO2 Readings from Last 6 Encounters:   25 96%   24 98%          Intake/Output Summary (Last 24 hours) at 3/24/2025 1338  Last data filed at 3/24/2025 0326  Gross per 24 hour   Intake 1763.36 ml   Output 1900 ml   Net -136.64 ml          Exam:     Physical Exam:    Gen:  Well-developed, well-nourished, in no acute distress  HEENT:  Pink conjunctivae, PERRL, hearing intact to voice, moist mucous membranes  Neck:  Supple, without masses, thyroid non-tender  Resp:  No accessory muscle use, clear breath sounds without wheezes rales or rhonchi  Card:  No murmurs, normal S1, S2 without thrills, bruits or peripheral edema  Abd:  Soft, non-tender, non-distended, normoactive bowel sounds are present  Musc:  No cyanosis or clubbing  Skin:  No rashes or ulcers, skin turgor is good  Neuro:  Cranial nerves 3-12 are grossly intact,  strength is 5/5 bilaterally and dorsi / plantarflexion is 5/5 bilaterally, follows commands appropriately  Psych:  Good insight, oriented to person, place and time, alert      Medications Reviewed: (see below)    Lab Data Reviewed: (see below)    ______________________________________________________________________    Medications:     Current Facility-Administered Medications   Medication Dose Route Frequency    ciprofloxacin  (CIPRO) tablet 500 mg  500 mg Oral BID    iron sucrose (VENOFER) injection 200 mg  200 mg IntraVENous Daily    sodium chloride flush 0.9 % injection 5-40 mL  5-40 mL IntraVENous 2 times per day    sodium chloride flush 0.9 % injection 5-40 mL  5-40 mL IntraVENous PRN    0.9 % sodium chloride infusion   IntraVENous PRN    ondansetron (ZOFRAN-ODT) disintegrating tablet 4 mg  4 mg Oral Q8H PRN    Or    ondansetron (ZOFRAN) injection 4 mg  4 mg IntraVENous Q6H PRN    polyethylene glycol (GLYCOLAX) packet 17 g  17 g Oral Daily PRN    acetaminophen (TYLENOL) tablet 650 mg  650 mg Oral Q6H PRN    Or    acetaminophen (TYLENOL) suppository 650 mg  650 mg Rectal Q6H PRN    0.9 % sodium chloride infusion   IntraVENous Continuous    rosuvastatin (CRESTOR) tablet 20 mg  20 mg Oral Nightly    [Held by provider] lisinopril (PRINIVIL;ZESTRIL) tablet 20 mg  20 mg Oral Daily    acyclovir (ZOVIRAX) capsule 200 mg  200 mg Oral BID    gabapentin (NEURONTIN) capsule 300 mg  300 mg Oral Daily    gabapentin (NEURONTIN) capsule 300 mg  300 mg Oral Lunch    gabapentin (NEURONTIN) capsule 400 mg  400 mg Oral Nightly            Lab Review:     Recent Labs     03/23/25  1111 03/24/25  0558   WBC 8.4 5.3   HGB 9.2* 8.0*   HCT 28.8* 25.1*    276     Recent Labs     03/23/25  1111 03/24/25  0558    141   K 4.0 4.0    110*   CO2 26 24   BUN 24* 18   MG  --  2.1   ALT 38  --      No components found for: \"GLPOC\"         Assessment / Plan:   Mr. Wong is a 78 yo male with PMH of HTN, XOL admitted with recurrent hematuria and weakness     Hematuria - resolved   -monitor with tesfaye in place   -holding NOAC (this was stopped in Dec per pt)      UTI (urinary tract infection) - per urology prior cultures with pseudomonas  -change antibiotics to cipro        Urinary retention - mild. Bladder scan in the ED was inconclusive but post cath insertion 400 out so was retaining  -VT in one week      Anemia - likely PIERRE   -s/p IV iron x

## 2025-03-24 NOTE — PROGRESS NOTES
NUTRITION    Best practice alert was triggered based on results obtained during nursing admission assessment for Weight loss 14-23#     Wt Readings from Last 30 Encounters:   03/23/25 100.7 kg (222 lb)   02/07/24 106.6 kg (235 lb)        Wt loss of 13 lbs over 13 months. Not significant for time frame. Current weight is pt stated. Please obtain measured weight.     Meal Intake:   Patient Vitals for the past 168 hrs:   PO Meals Eaten (%)   03/23/25 1758 76 - 100%     Supplement Intake:  No data found.      The patient's chart was reviewed and nutrition assessment is not indicated at this time.  Plan to see patient for rescreen per policy.  Thank you.       Ephraim Arredondo RD  Ext: [phone], or via [employer]

## 2025-03-24 NOTE — PLAN OF CARE
Problem: Physical Therapy - Adult  Goal: By Discharge: Performs mobility at highest level of function for planned discharge setting.  See evaluation for individualized goals.  Description: FUNCTIONAL STATUS PRIOR TO ADMISSION: Patient was independent and active without use of DME.  He continues to drive and lives alone    HOME SUPPORT PRIOR TO ADMISSION: The patient lived alone with son/dtr and family to provide assistance.    Physical Therapy Goals  Initiated 3/24/2025  1.  Patient will move from supine to sit and sit to supine in bed with supervision/set-up within 7 day(s).    2.  Patient will perform sit to stand with contact guard assist within 7 day(s).  3.  Patient will transfer from bed to chair and chair to bed with contact guard assist using the least restrictive device within 7 day(s).  4.  Patient will ambulate with minimal assistance for 50 feet with the least restrictive device within 7 day(s).   5.  Patient will ascend/descend 2 stairs with 1 handrail(s) with minimal assistance within 7 day(s).  Outcome: Progressing   PHYSICAL THERAPY EVALUATION    Patient: Edil Wong (79 y.o. male)  Date: 3/24/2025  Primary Diagnosis: Urinary retention [R33.9]  UTI (urinary tract infection) [N39.0]  General weakness [R53.1]  Urinary tract infection with hematuria, site unspecified [N39.0, R31.9]       Precautions: Restrictions/Precautions  Restrictions/Precautions: Fall Risk            ASSESSMENT :   DEFICITS/IMPAIRMENTS:   The patient is limited by decreased functional mobility, independence in ADLs, strength, body mechanics, activity tolerance, safety awareness, coordination, balance.  Pt admitted due to UTI with recent admission and discharge from .  He has hx of prostate CA.    Based on the impairments listed above pt needing Mod to Max A with sit<>stand and gait of 4' to chair needing RW.  L knee with tendency to buckle and poor eccentric quads and poor safety awareness with stand to sit falling back

## 2025-03-24 NOTE — CARE COORDINATION
Care Management Progress Note    Reason for Admission:   Urinary retention [R33.9]  UTI (urinary tract infection) [N39.0]  General weakness [R53.1]  Urinary tract infection with hematuria, site unspecified [N39.0, R31.9]         Patient Admission Status: Observation  RUR: 13%  Hospitalization in the last 30 days (Readmission):  No        CM reviewed EMR and pt was discussed in IDRs.   Reportedly, pt resides alone.   Dtr Aishwarya Villaseñor ([phone])    Transition Plan of Care:  Urology following for medical management  PT/OT eval complete; pt ambulated 4 feet on 3/24 and IPR was recommended.  IPR - referral was sent to [facility] per choice  Pt has Medicare so no auth is needed  Outpatient follow up  Mode of transport to be determined closer to disharge    CM will continue to follow pt for IPR  Binta

## 2025-03-25 LAB
ANION GAP SERPL CALC-SCNC: 6 MMOL/L (ref 2–12)
BASOPHILS # BLD: 0.03 K/UL (ref 0–0.1)
BASOPHILS NFR BLD: 0.6 % (ref 0–1)
BUN SERPL-MCNC: 15 MG/DL (ref 6–20)
BUN/CREAT SERPL: 19 (ref 12–20)
CALCIUM SERPL-MCNC: 8.6 MG/DL (ref 8.5–10.1)
CHLORIDE SERPL-SCNC: 111 MMOL/L (ref 97–108)
CO2 SERPL-SCNC: 24 MMOL/L (ref 21–32)
CREAT SERPL-MCNC: 0.79 MG/DL (ref 0.7–1.3)
DIFFERENTIAL METHOD BLD: ABNORMAL
ENA JO1 AB SER-ACNC: <0.2 AI (ref 0–0.9)
EOSINOPHIL # BLD: 0.26 K/UL (ref 0–0.4)
EOSINOPHIL NFR BLD: 4.8 % (ref 0–7)
ERYTHROCYTE [DISTWIDTH] IN BLOOD BY AUTOMATED COUNT: 13.7 % (ref 11.5–14.5)
ERYTHROCYTE [SEDIMENTATION RATE] IN BLOOD: 127 MM/HR (ref 0–20)
FOLATE SERPL-MCNC: 24.9 NG/ML (ref 5–21)
GLUCOSE SERPL-MCNC: 93 MG/DL (ref 65–100)
HCT VFR BLD AUTO: 25.6 % (ref 36.6–50.3)
HGB BLD-MCNC: 8.1 G/DL (ref 12.1–17)
IMM GRANULOCYTES # BLD AUTO: 0.07 K/UL (ref 0–0.04)
IMM GRANULOCYTES NFR BLD AUTO: 1.3 % (ref 0–0.5)
LYMPHOCYTES # BLD: 0.56 K/UL (ref 0.8–3.5)
LYMPHOCYTES NFR BLD: 10.4 % (ref 12–49)
MCH RBC QN AUTO: 29.6 PG (ref 26–34)
MCHC RBC AUTO-ENTMCNC: 31.6 G/DL (ref 30–36.5)
MCV RBC AUTO: 93.4 FL (ref 80–99)
MONOCYTES # BLD: 0.42 K/UL (ref 0–1)
MONOCYTES NFR BLD: 7.8 % (ref 5–13)
NEUTS SEG # BLD: 4.06 K/UL (ref 1.8–8)
NEUTS SEG NFR BLD: 75.1 % (ref 32–75)
NRBC # BLD: 0 K/UL (ref 0–0.01)
NRBC BLD-RTO: 0 PER 100 WBC
PLATELET # BLD AUTO: 307 K/UL (ref 150–400)
PMV BLD AUTO: 9 FL (ref 8.9–12.9)
POTASSIUM SERPL-SCNC: 3.9 MMOL/L (ref 3.5–5.1)
RBC # BLD AUTO: 2.74 M/UL (ref 4.1–5.7)
RBC MORPH BLD: ABNORMAL
RETICS # AUTO: 0.07 M/UL (ref 0.03–0.1)
RETICS/RBC NFR AUTO: 2.5 % (ref 0.7–2.1)
SODIUM SERPL-SCNC: 141 MMOL/L (ref 136–145)
VIT B12 SERPL-MCNC: 1179 PG/ML (ref 193–986)
WBC # BLD AUTO: 5.4 K/UL (ref 4.1–11.1)

## 2025-03-25 PROCEDURE — 85652 RBC SED RATE AUTOMATED: CPT

## 2025-03-25 PROCEDURE — 82746 ASSAY OF FOLIC ACID SERUM: CPT

## 2025-03-25 PROCEDURE — 80048 BASIC METABOLIC PNL TOTAL CA: CPT

## 2025-03-25 PROCEDURE — 2500000003 HC RX 250 WO HCPCS: Performed by: INTERNAL MEDICINE

## 2025-03-25 PROCEDURE — G0378 HOSPITAL OBSERVATION PER HR: HCPCS

## 2025-03-25 PROCEDURE — 82607 VITAMIN B-12: CPT

## 2025-03-25 PROCEDURE — 85045 AUTOMATED RETICULOCYTE COUNT: CPT

## 2025-03-25 PROCEDURE — 1100000000 HC RM PRIVATE

## 2025-03-25 PROCEDURE — 97530 THERAPEUTIC ACTIVITIES: CPT

## 2025-03-25 PROCEDURE — 51702 INSERT TEMP BLADDER CATH: CPT

## 2025-03-25 PROCEDURE — 85025 COMPLETE CBC W/AUTO DIFF WBC: CPT

## 2025-03-25 PROCEDURE — 97530 THERAPEUTIC ACTIVITIES: CPT | Performed by: PHYSICAL THERAPIST

## 2025-03-25 PROCEDURE — 6370000000 HC RX 637 (ALT 250 FOR IP): Performed by: INTERNAL MEDICINE

## 2025-03-25 PROCEDURE — 36415 COLL VENOUS BLD VENIPUNCTURE: CPT

## 2025-03-25 RX ADMIN — CIPROFLOXACIN HYDROCHLORIDE 500 MG: 500 TABLET, FILM COATED ORAL at 20:52

## 2025-03-25 RX ADMIN — GABAPENTIN 300 MG: 300 CAPSULE ORAL at 10:46

## 2025-03-25 RX ADMIN — GABAPENTIN 300 MG: 300 CAPSULE ORAL at 12:19

## 2025-03-25 RX ADMIN — ACYCLOVIR 200 MG: 200 CAPSULE ORAL at 12:19

## 2025-03-25 RX ADMIN — ACETAMINOPHEN 650 MG: 325 TABLET ORAL at 21:03

## 2025-03-25 RX ADMIN — ACYCLOVIR 200 MG: 200 CAPSULE ORAL at 20:52

## 2025-03-25 RX ADMIN — CIPROFLOXACIN HYDROCHLORIDE 500 MG: 500 TABLET, FILM COATED ORAL at 10:46

## 2025-03-25 RX ADMIN — ROSUVASTATIN CALCIUM 20 MG: 10 TABLET, FILM COATED ORAL at 20:52

## 2025-03-25 RX ADMIN — GABAPENTIN 400 MG: 300 CAPSULE ORAL at 20:52

## 2025-03-25 RX ADMIN — SODIUM CHLORIDE, PRESERVATIVE FREE 10 ML: 5 INJECTION INTRAVENOUS at 10:47

## 2025-03-25 RX ADMIN — SODIUM CHLORIDE, PRESERVATIVE FREE 10 ML: 5 INJECTION INTRAVENOUS at 20:53

## 2025-03-25 ASSESSMENT — PAIN DESCRIPTION - ORIENTATION: ORIENTATION: LEFT

## 2025-03-25 ASSESSMENT — PAIN SCALES - GENERAL: PAINLEVEL_OUTOF10: 3

## 2025-03-25 ASSESSMENT — PAIN DESCRIPTION - LOCATION: LOCATION: KNEE

## 2025-03-25 ASSESSMENT — PAIN DESCRIPTION - DESCRIPTORS: DESCRIPTORS: ACHING

## 2025-03-25 NOTE — CARE COORDINATION
Care Management Progress Note    Reason for Admission:   Urinary retention [R33.9]  UTI (urinary tract infection) [N39.0]  General weakness [R53.1]  Urinary tract infection with hematuria, site unspecified [N39.0, R31.9]         Patient Admission Status: Observation  RUR:  12%  Hospitalization in the last 30 days (Readmission):  No        Reportedly, pt resides alone.   Dtr Aishwarya Villaseñor ([phone])     Transition Plan of Care:  Urology following for medical management  PT/OT eval complete; pt ambulated 4 feet on 3/24 and IPR was recommended.  IPR - referral was sent to [facility] per choice and they have accepted  Pt has Medicare so no auth is needed  Outpatient follow up  Mode of transport to be determined closer to disharge     CM will continue to follow pt for IPR (anticipate available bed today)  Binta

## 2025-03-25 NOTE — PROGRESS NOTES
scan in the ED was inconclusive but post cath insertion 400 out so was retaining  -Voiding trial in one week      Anemia - likely PIERRE   -s/p IV iron x 1  -iron studies not with def  -check B12/folate  -check stool blood   -repeat H+H in the AM      Weakness - 2/2 UTI, anemia, deconditioning from the hospital. No focal deficits to suggest CVA. No alteration in reflexes, sensorium to suspect spinal cord pathology. ?myositis   -CK WNL   -anti-Kati pending   -ESR quite high; ?2/2 UTI; no HA, vision changes, myalgias to suspect PMR   -PT/OT eval   -fall precautions   -will need IPR  -orthostatics      HTN   -holding lisinopril      XOL   -on statin     Neuropathy   -on gabapentin     Total time spent with patient: 35 Minutes                  Care Plan discussed with: Patient and Family    Discussed:  Care Plan    Prophylaxis:  SCD's    Disposition:  [facility]/Rehab           ___________________________________________________    Attending Physician: Khurram Law MD

## 2025-03-25 NOTE — PLAN OF CARE
Problem: Physical Therapy - Adult  Goal: By Discharge: Performs mobility at highest level of function for planned discharge setting.  See evaluation for individualized goals.  Description: FUNCTIONAL STATUS PRIOR TO ADMISSION: Patient was independent and active without use of DME.  He continues to drive and lives alone    HOME SUPPORT PRIOR TO ADMISSION: The patient lived alone with son/dtr and family to provide assistance.    Physical Therapy Goals  Initiated 3/24/2025  1.  Patient will move from supine to sit and sit to supine in bed with supervision/set-up within 7 day(s).    2.  Patient will perform sit to stand with contact guard assist within 7 day(s).  3.  Patient will transfer from bed to chair and chair to bed with contact guard assist using the least restrictive device within 7 day(s).  4.  Patient will ambulate with minimal assistance for 50 feet with the least restrictive device within 7 day(s).   5.  Patient will ascend/descend 2 stairs with 1 handrail(s) with minimal assistance within 7 day(s).  Outcome: Progressing   PHYSICAL THERAPY TREATMENT    Patient: Edil Wong (79 y.o. male)  Date: 3/25/2025  Diagnosis: Urinary retention [R33.9]  UTI (urinary tract infection) [N39.0]  General weakness [R53.1]  Urinary tract infection with hematuria, site unspecified [N39.0, R31.9] UTI (urinary tract infection)      Precautions: Restrictions/Precautions  Restrictions/Precautions: Fall Risk            ASSESSMENT:  Patient continues to benefit from skilled PT services and is slowly progressing towards goals. Patient overall limited by B LE numbness, impaired balance, inability to ambulate, generalized weakness and decreased activity tolerance.  Currently patient requires modA to come to the EOB.  Has difficulty moving LE's off the bed and requires up to modA to manage trunk to upright.  MaxA to scoot to the EOB with intermittent bouts of falling backward.  Attempted sit to stand but unable to clear buttocks

## 2025-03-25 NOTE — PLAN OF CARE
Problem: Safety - Adult  Goal: Free from fall injury  3/25/2025 0141 by Nicolette Rivas, RN  Outcome: Progressing  3/25/2025 0139 by Nicolette Rivas, RN  Outcome: Progressing      Problem: Chronic Conditions and Co-morbidities  Goal: Patient's chronic conditions and co-morbidity symptoms are monitored and maintained or improved  Outcome: Progressing

## 2025-03-25 NOTE — PLAN OF CARE
Problem: Occupational Therapy - Adult  Goal: By Discharge: Performs self-care activities at highest level of function for planned discharge setting.  See evaluation for individualized goals.  Description: FUNCTIONAL STATUS PRIOR TO ADMISSION:  Pt was independent and active.  Ambulated with no AD and still driving.  Receives Help From: Family, Neighbor, Prior Level of Assist for ADLs: Independent,  ,  ,  ,  ,  , Prior Level of Assist for Homemaking: Independent,  , Prior Level of Assist for Transfers: Independent, Active : Yes     HOME SUPPORT: Patient lived alone with lots of supportive family in the area.    Occupational Therapy Goals:  Initiated 3/24/2025  1.  Patient will perform lower body dressing with Supervision within 7 day(s).  2.  Patient will perform bathing with Stand by Assist within 7 day(s).  3.  Patient will perform grooming standing at sink with Contact Guard Assist within 7 day(s).  4.  Patient will perform toilet transfers with Supervision  within 7 day(s).  5.  Patient will perform all aspects of toileting with Supervision within 7 day(s).  6.  Patient will participate in upper extremity therapeutic exercise/activities with Supervision for 10 minutes within 7 day(s).    7.  Patient will utilize energy conservation techniques during functional activities with verbal cues within 7 day(s).   Outcome: Progressing   OCCUPATIONAL THERAPY TREATMENT  Patient: Edil Wong (79 y.o. male)  Date: 3/25/2025  Primary Diagnosis: Urinary retention [R33.9]  UTI (urinary tract infection) [N39.0]  General weakness [R53.1]  Urinary tract infection with hematuria, site unspecified [N39.0, R31.9]       Precautions: Fall Risk                Chart, occupational therapy assessment, plan of care, and goals were reviewed.    ASSESSMENT  Patient continues to benefit from skilled OT services and is progressing towards goals. Pt seated in chair, practiced sit to stands however only able to hold about 8 seconds  before returning to sit 5 x. Staff using cookie steady for pt to use BEDSIDE COMMODE. LE's very weak, pt stated hamstrings very tight. Pt engaged with upper extremities there ex. He stated having and using AE at home for bathing and dressing.             PLAN :  Patient continues to benefit from skilled intervention to address the above impairments.  Continue treatment per established plan of care to address goals.    Recommend with staff: Adl's, there ex, there act    Recommend next OT session: cont towards goals    Recommendation for discharge: (in order for the patient to meet his/her long term goals):   High intensity/comprehensive skilled occupational therapy in a multidisciplinary setting as patient is working towards tolerating up to 3 hours of therapy/day 5-7x/week    Other factors to consider for discharge: patient's current support system is unable to meet their requirements for physical assistance and concern for safely navigating or managing the home environment    IF patient discharges home will need the following DME:        SUBJECTIVE:   Patient stated “My legs are so weak.”    OBJECTIVE DATA SUMMARY:   Cognitive/Behavioral Status:  Orientation  Overall Orientation Status: Within Normal Limits  Orientation Level: Oriented X4       Functional Mobility and Transfers for ADLs:  Bed Mobility:  Bed Mobility Training  Rolling: Minimal assistance  Supine to Sit: Minimal assistance  Scooting: Substantial/Maximal assistance (to scoot to EOB)     Transfers:   Transfer Training  Sit to Stand: Substantial/Maximal assistance (uanble to come to stand with RW and 1 person assist, Merly  with Stedy)  Stand to Sit: Substantial/Maximal assistance           Balance:     Balance  Sitting: Impaired  Sitting - Static: Fair (occasional)  Sitting - Dynamic: Poor (constant support)  Standing: Impaired  Standing - Static: Constant support;Poor  Standing - Dynamic: Constant support;Poor      ADL Intervention:     Set-up grooming

## 2025-03-26 ENCOUNTER — APPOINTMENT (OUTPATIENT)
Facility: HOSPITAL | Age: 80
DRG: 840 | End: 2025-03-26
Payer: MEDICARE

## 2025-03-26 ENCOUNTER — HOSPITAL ENCOUNTER (OUTPATIENT)
Facility: HOSPITAL | Age: 80
Discharge: HOME | End: 2025-03-29

## 2025-03-26 VITALS
HEIGHT: 70 IN | OXYGEN SATURATION: 97 % | BODY MASS INDEX: 31.78 KG/M2 | HEART RATE: 79 BPM | SYSTOLIC BLOOD PRESSURE: 113 MMHG | WEIGHT: 222 LBS | TEMPERATURE: 98.8 F | DIASTOLIC BLOOD PRESSURE: 59 MMHG | RESPIRATION RATE: 15 BRPM

## 2025-03-26 PROBLEM — R29.898 WEAKNESS OF BOTH LOWER EXTREMITIES: Status: ACTIVE | Noted: 2025-03-26

## 2025-03-26 PROBLEM — Z85.46 HISTORY OF PROSTATE CANCER: Status: ACTIVE | Noted: 2025-03-26

## 2025-03-26 PROBLEM — Z85.79 H/O MULTIPLE MYELOMA: Status: ACTIVE | Noted: 2025-03-26

## 2025-03-26 LAB — PSA SERPL-MCNC: 0.1 NG/ML (ref 0.01–4)

## 2025-03-26 PROCEDURE — 6360000004 HC RX CONTRAST MEDICATION: Performed by: RADIOLOGY

## 2025-03-26 PROCEDURE — 97530 THERAPEUTIC ACTIVITIES: CPT

## 2025-03-26 PROCEDURE — 2000000000 HC ICU R&B

## 2025-03-26 PROCEDURE — 70450 CT HEAD/BRAIN W/O DYE: CPT

## 2025-03-26 PROCEDURE — 72158 MRI LUMBAR SPINE W/O & W/DYE: CPT

## 2025-03-26 PROCEDURE — 99223 1ST HOSP IP/OBS HIGH 75: CPT | Performed by: INTERNAL MEDICINE

## 2025-03-26 PROCEDURE — 6370000000 HC RX 637 (ALT 250 FOR IP): Performed by: INTERNAL MEDICINE

## 2025-03-26 PROCEDURE — 71260 CT THORAX DX C+: CPT

## 2025-03-26 PROCEDURE — 97535 SELF CARE MNGMENT TRAINING: CPT | Performed by: OCCUPATIONAL THERAPIST

## 2025-03-26 PROCEDURE — 72157 MRI CHEST SPINE W/O & W/DYE: CPT

## 2025-03-26 PROCEDURE — 36415 COLL VENOUS BLD VENIPUNCTURE: CPT

## 2025-03-26 PROCEDURE — 6360000004 HC RX CONTRAST MEDICATION: Performed by: STUDENT IN AN ORGANIZED HEALTH CARE EDUCATION/TRAINING PROGRAM

## 2025-03-26 PROCEDURE — 99222 1ST HOSP IP/OBS MODERATE 55: CPT | Performed by: NURSE PRACTITIONER

## 2025-03-26 PROCEDURE — 2500000003 HC RX 250 WO HCPCS: Performed by: INTERNAL MEDICINE

## 2025-03-26 PROCEDURE — 6360000002 HC RX W HCPCS: Performed by: STUDENT IN AN ORGANIZED HEALTH CARE EDUCATION/TRAINING PROGRAM

## 2025-03-26 PROCEDURE — G0103 PSA SCREENING: HCPCS

## 2025-03-26 PROCEDURE — 72156 MRI NECK SPINE W/O & W/DYE: CPT

## 2025-03-26 PROCEDURE — 97530 THERAPEUTIC ACTIVITIES: CPT | Performed by: OCCUPATIONAL THERAPIST

## 2025-03-26 PROCEDURE — A9579 GAD-BASE MR CONTRAST NOS,1ML: HCPCS | Performed by: RADIOLOGY

## 2025-03-26 RX ORDER — DEXAMETHASONE SODIUM PHOSPHATE 10 MG/ML
10 INJECTION, SOLUTION INTRAMUSCULAR; INTRAVENOUS EVERY 6 HOURS
Status: DISCONTINUED | OUTPATIENT
Start: 2025-03-26 | End: 2025-03-27 | Stop reason: HOSPADM

## 2025-03-26 RX ORDER — IOPAMIDOL 755 MG/ML
100 INJECTION, SOLUTION INTRAVASCULAR
Status: COMPLETED | OUTPATIENT
Start: 2025-03-26 | End: 2025-03-26

## 2025-03-26 RX ORDER — DEXAMETHASONE 4 MG/1
4 TABLET ORAL EVERY 6 HOURS SCHEDULED
Status: DISCONTINUED | OUTPATIENT
Start: 2025-03-26 | End: 2025-03-26

## 2025-03-26 RX ORDER — DEXAMETHASONE SODIUM PHOSPHATE 10 MG/ML
10 INJECTION, SOLUTION INTRAMUSCULAR; INTRAVENOUS ONCE
Status: COMPLETED | OUTPATIENT
Start: 2025-03-26 | End: 2025-03-26

## 2025-03-26 RX ORDER — DEXAMETHASONE SODIUM PHOSPHATE 10 MG/ML
8 INJECTION, SOLUTION INTRAMUSCULAR; INTRAVENOUS EVERY 6 HOURS
Status: DISCONTINUED | OUTPATIENT
Start: 2025-03-26 | End: 2025-03-26

## 2025-03-26 RX ADMIN — DEXAMETHASONE SODIUM PHOSPHATE 10 MG: 10 INJECTION, SOLUTION INTRAMUSCULAR; INTRAVENOUS at 16:38

## 2025-03-26 RX ADMIN — GABAPENTIN 400 MG: 300 CAPSULE ORAL at 21:43

## 2025-03-26 RX ADMIN — GABAPENTIN 300 MG: 300 CAPSULE ORAL at 12:14

## 2025-03-26 RX ADMIN — SODIUM CHLORIDE, PRESERVATIVE FREE 10 ML: 5 INJECTION INTRAVENOUS at 21:43

## 2025-03-26 RX ADMIN — GABAPENTIN 300 MG: 300 CAPSULE ORAL at 08:20

## 2025-03-26 RX ADMIN — ACYCLOVIR 200 MG: 200 CAPSULE ORAL at 08:20

## 2025-03-26 RX ADMIN — GADOTERIDOL 20 ML: 279.3 INJECTION, SOLUTION INTRAVENOUS at 15:41

## 2025-03-26 RX ADMIN — CIPROFLOXACIN HYDROCHLORIDE 500 MG: 500 TABLET, FILM COATED ORAL at 08:20

## 2025-03-26 RX ADMIN — SODIUM CHLORIDE, PRESERVATIVE FREE 10 ML: 5 INJECTION INTRAVENOUS at 08:21

## 2025-03-26 RX ADMIN — ROSUVASTATIN CALCIUM 20 MG: 10 TABLET, FILM COATED ORAL at 21:47

## 2025-03-26 RX ADMIN — IOPAMIDOL 100 ML: 755 INJECTION, SOLUTION INTRAVENOUS at 14:15

## 2025-03-26 RX ADMIN — CIPROFLOXACIN HYDROCHLORIDE 500 MG: 500 TABLET, FILM COATED ORAL at 21:44

## 2025-03-26 ASSESSMENT — PAIN SCALES - GENERAL: PAINLEVEL_OUTOF10: 0

## 2025-03-26 NOTE — PLAN OF CARE
Problem: Occupational Therapy - Adult  Goal: By Discharge: Performs self-care activities at highest level of function for planned discharge setting.  See evaluation for individualized goals.  Description: FUNCTIONAL STATUS PRIOR TO ADMISSION:  Pt was independent and active.  Ambulated with no AD and still driving.  Receives Help From: Family, Neighbor, Prior Level of Assist for ADLs: Independent,  ,  ,  ,  ,  , Prior Level of Assist for Homemaking: Independent,  , Prior Level of Assist for Transfers: Independent, Active : Yes     HOME SUPPORT: Patient lived alone with lots of supportive family in the area.    Occupational Therapy Goals:  Initiated 3/24/2025  1.  Patient will perform lower body dressing with Supervision within 7 day(s).  2.  Patient will perform bathing with Stand by Assist within 7 day(s).  3.  Patient will perform grooming standing at sink with Contact Guard Assist within 7 day(s).  4.  Patient will perform toilet transfers with Supervision  within 7 day(s).  5.  Patient will perform all aspects of toileting with Supervision within 7 day(s).  6.  Patient will participate in upper extremity therapeutic exercise/activities with Supervision for 10 minutes within 7 day(s).    7.  Patient will utilize energy conservation techniques during functional activities with verbal cues within 7 day(s).   3/26/2025 1152 by Cande Gilman, OTR/L  Outcome: Not Progressing   PHYSICAL THERAPY TREATMENT    Patient: Edil Wong (79 y.o. male)  Date: 3/26/2025  Diagnosis: Urinary retention [R33.9]  UTI (urinary tract infection) [N39.0]  General weakness [R53.1]  Urinary tract infection with hematuria, site unspecified [N39.0, R31.9] UTI (urinary tract infection)      Precautions: Restrictions/Precautions  Restrictions/Precautions: Fall Risk            ASSESSMENT:  Patient continues to benefit from skilled PT services.Pt sitting on bedside commode on arrival of PT.Pt attempted sit to stand x 2 unable to come

## 2025-03-26 NOTE — PLAN OF CARE
Problem: Occupational Therapy - Adult  Goal: By Discharge: Performs self-care activities at highest level of function for planned discharge setting.  See evaluation for individualized goals.  Description: FUNCTIONAL STATUS PRIOR TO ADMISSION:  Pt was independent and active.  Ambulated with no AD and still driving.  Receives Help From: Family, Neighbor, Prior Level of Assist for ADLs: Independent,  ,  ,  ,  ,  , Prior Level of Assist for Homemaking: Independent,  , Prior Level of Assist for Transfers: Independent, Active : Yes     HOME SUPPORT: Patient lived alone with lots of supportive family in the area.    Occupational Therapy Goals:  Initiated 3/24/2025  1.  Patient will perform lower body dressing with Supervision within 7 day(s).  2.  Patient will perform bathing with Stand by Assist within 7 day(s).  3.  Patient will perform grooming standing at sink with Contact Guard Assist within 7 day(s).  4.  Patient will perform toilet transfers with Supervision  within 7 day(s).  5.  Patient will perform all aspects of toileting with Supervision within 7 day(s).  6.  Patient will participate in upper extremity therapeutic exercise/activities with Supervision for 10 minutes within 7 day(s).    7.  Patient will utilize energy conservation techniques during functional activities with verbal cues within 7 day(s).   Outcome: Not Progressing     OCCUPATIONAL THERAPY TREATMENT  Patient: Edil Wong (79 y.o. male)  Date: 3/26/2025  Primary Diagnosis: Urinary retention [R33.9]  UTI (urinary tract infection) [N39.0]  General weakness [R53.1]  Urinary tract infection with hematuria, site unspecified [N39.0, R31.9]       Precautions: Fall Risk                Chart, occupational therapy assessment, plan of care, and goals were reviewed.    ASSESSMENT  Patient continues to benefit from skilled OT services and is not progressing towards goals. This date called to room as RN unable to get patient off of bedside commode  patient      Activity Tolerance:   Good  Please refer to the flowsheet for vital signs taken during this treatment.    After treatment:   Patient left in no apparent distress in bed, Call bell within reach, Side rails x3, and Heel protection boot applied for pressure relief    COMMUNICATION/EDUCATION:   The patient's plan of care was discussed with: physical therapist, registered nurse, and physician    Patient Education  Education Given To: Patient  Education Provided: Role of Therapy;ADL Adaptive Strategies;Precautions;Fall Prevention Strategies;Home Exercise Program  Education Method: Demonstration;Verbal;Teach Back  Barriers to Learning: None  Education Outcome: Verbalized understanding;Continued education needed    Thank you for this referral.  Cande Gilman OTR/L  Minutes: 24

## 2025-03-26 NOTE — PROGRESS NOTES
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     Notified ortho and onc. Paged neurosurgery at [facility]. Rad onc consult rec NSGY, offering emergent RT if surgery not possible. On Dex    Khurram Law MD

## 2025-03-26 NOTE — PROGRESS NOTES
[facility]  [address] [place] [address] [phone]        Hospitalist Progress Note      NAME: Edil Wong   :  1945  MRM:  [id number]    Date/Time: 3/26/2025  3:07 PM         Subjective:     3/24  Pt seen and examined. Carr in place. Urine appears yellow. No complaints. Plan discussed with family. PT/OT recommending IPR     3/25  Patient reported numbness and tingling and chronic neuropathy to physical therapy and me.  Noted to be significantly weaker/deconditioned by PT and OT.  Patient has no other complaints    3/26  Called to bedside by OT regarding new weakness found on her evaluation.  Patient much weaker than yesterday, unable to keep his knees up on the bed, falling to the side.  OT reported patient was unable to get off bedside commode with Susi steady, required 3 people for sit to stand, poor truncal support, 3 people prior to return to bed.  Patient reported that he could not feel his legs.    Noted also to have had fecal incontinence on the way to the commode.  This is new.  Daughter states that the only time he has had similar weakness is when he had a spinal mass in the past.       Objective:       Vitals:          Last 24hrs VS reviewed since prior progress note. Most recent are:    Vitals:    25 0952   BP: 121/64   Pulse: 89   Resp: 19   Temp: 98.2 °F (36.8 °C)   SpO2: 96%     SpO2 Readings from Last 6 Encounters:   25 96%   24 98%          Intake/Output Summary (Last 24 hours) at 3/26/2025 1507  Last data filed at 3/26/2025 1106  Gross per 24 hour   Intake 400 ml   Output 800 ml   Net -400 ml          Exam:     Physical Exam:    Gen:  Well-developed, well-nourished, in no acute distress  HEENT:  Pink conjunctivae, PERRL, hearing intact to voice, moist mucous membranes  Neck:  Supple, without masses, thyroid non-tender  Resp:  No accessory muscle use, clear breath sounds without wheezes rales or rhonchi  Card:  No

## 2025-03-26 NOTE — CONSULTS
Case discussed with Dr. Law who has reviewed imaging and ascertained that this is more of a neuro spine issue.  Consult has been canceled.  Please call back with questions

## 2025-03-26 NOTE — CONSULTS
Orthopedic History and Physical     Patient: Edil Wong MRN: [id number]  SSN: [id number]    YOB: 1945  Age: 79 y.o.  Sex: male          Subjective:     Patient is a 79 y.o.  male who complains of significant weakness in bilateral LE. Pt has been admitted since 3/23 for UTI and urinary retention. Pt had been admitted to [facility] for hematuria requiring cysto, clot evac and fulguration on 3/21. Pt presented to [facility], a tesfaye catheter was placed (as patient requested). In discussion with Dr. Law his strength was only slightly decreased with strength a 4/5 on the right LE. Today, spontaneously he complains of bilateral LE weakness, right worse than left. He is now unable to lift RLE off bed. Pt did have an episode of bowel incontinence when moving over to chair. He did have the sensation that he had to go, but, didn't realize he was going. Denies paraesthesias.  PT with hx of prostate CA.  Of note, the patient has a history of a laminectomy from bottom of T4 through the top of T8 with removal of the spinous process laminas, decompression of spinal cord, and removal of large epidural mass T5, T6, and T7 performed by Dr. Dubon on 11/05/2015. Also to note patient with hx of  large lytic lesion in the right intertrochanteric region with cortical breakthrough anteriorly, therefore patient had a right bipolar hemiarthroplasty by Dr. Case.      Patient Active Problem List    Diagnosis Date Noted    UTI (urinary tract infection) 03/23/2025    Urinary retention 03/23/2025     No past medical history on file.   No past surgical history on file.   Prior to Admission medications    Medication Sig Start Date End Date Taking? Authorizing Provider   sulfamethoxazole-trimethoprim (BACTRIM DS;SEPTRA DS) 800-160 MG per tablet Take 1 tablet by mouth 2 times daily for 7 days 3/23/25 3/30/25 Yes Amparo Jackman, APRN - NP   oxymetazoline (AFRIN) 0.05 % nasal spray 2 sprays by Nasal route 2 times

## 2025-03-26 NOTE — PLAN OF CARE
Problem: Safety - Adult  Goal: Free from fall injury  Outcome: Progressing     Problem: Chronic Conditions and Co-morbidities  Goal: Patient's chronic conditions and co-morbidity symptoms are monitored and maintained or improved  Outcome: Progressing  Flowsheets (Taken 3/25/2025 2021)  Care Plan - Patient's Chronic Conditions and Co-Morbidity Symptoms are Monitored and Maintained or Improved: Monitor and assess patient's chronic conditions and comorbid symptoms for stability, deterioration, or improvement

## 2025-03-26 NOTE — CARE COORDINATION
Care Management Progress Note    Reason for Admission:   Urinary retention [R33.9]  UTI (urinary tract infection) [N39.0]  General weakness [R53.1]  Urinary tract infection with hematuria, site unspecified [N39.0, R31.9]         Patient Admission Status: Observation  RUR:  12%  Hospitalization in the last 30 days (Readmission):  No        Reportedly, pt resides alone.   Dtr Aishwarya Villaseñor ([phone])     Transition Plan of Care:  Urology following for medical management  PT/OT eval complete; pt ambulated 4 feet on 3/24 and IPR was recommended.  [facility] however [facility] can't [facility] pt on IV[facility] (oncology to weigh in)  Pt has Medicare so no auth is needed  Outpatient follow up  Mode of transport to be determined closer to disharge     CM will continue to follow pt for IPR   Binta

## 2025-03-26 NOTE — PROGRESS NOTES
In short patient is an 79-year-old male with a history of prostate cancer, multiple myeloma who was admitted on 23 March with concern for UTI as well as weakness.  In review of the notes it seems like over the last several days his weakness has slowly progressed.  By report his weakness earlier today has decompensated which prompted consultation with us and advanced imaging which was just recently completed.  On review of his MRI, his cervical and thoracic MRIs do not show any significant pathology to explain the symptoms.  His thoracic MRI shows prior laminectomy defects at T6 and T7 consistent with prior decompression surgery in years past.  His thoracic spine also shows what appears to be an epidural mass with soft tissue extension in and around the vertebral body as well as posterior elements with cord compression at T1-T3 without evidence of acute trauma or instability.  Additionally on brief review of the CT chest abdomen pelvis he has DISH like changes and much of the thoracic spine as well as the cervical thoracic junction which in my mind suggest stability.    Due to his progression of neurologic symptoms and these new advanced imaging findings of a mass with spinal cord compression in the upper thoracic spine I think surgical intervention is obviously warranted.  Here at [facility] we can get operating room time around noon on March 27 and we can also have neuromonitoring available.  My surgical plan would be laminectomy T1-4.  I think consideration of posterolateral fusion is reasonable however with what appears to be significant disc changes anteriorly and his prior success with laminectomy only I think laminectomy alone is a reasonable consideration without obvious deformity or acute pathology.      # Lesion around T2 with cord compression and edema:   -This lesion appears to be extradural, around the vertebral body of T2 in particular expanding into the epidural space and some into the posterior

## 2025-03-26 NOTE — CONSULTS
independently.   Mr. Wong denies a history of inflammatory bowel disease, scleroderma or other collagen vascular diseases, pacemaker/AICD.    He has had several prior RT courses as mentioned above.      Review of Systems:  A complete review of systems was obtained and negative except as described above.    Allergies and Medications   No Known Allergies    Current Outpatient Medications   Medication Instructions    acetaminophen (TYLENOL) 1,000 mg, Oral, EVERY 6-8 HOURS PRN    acyclovir (ZOVIRAX) 200 mg, EVERY 12 HOURS    denosumab (XGEVA) 120 mg, ONCE    Elderberry-Vitamin C-Zinc (FoKo GUMMY PO) 1 gum, DAILY    famotidine (PEPCID) 20 mg, DAILY    fexofenadine (ALLEGRA) 180 mg, DAILY PRN    fluticasone (VERAMYST) 27.5 MCG/SPRAY nasal spray 2 sprays, DAILY    gabapentin (NEURONTIN) 300 mg, 3 TIMES DAILY    guaiFENesin (MUCINEX) 1,200 mg, 2 TIMES DAILY    ibuprofen (ADVIL;MOTRIN) 800 mg, Oral, EVERY 8 HOURS PRN    lisinopril (PRINIVIL;ZESTRIL) 20 mg, DAILY    melatonin 3 mg, NIGHTLY    Multiple Vitamins-Minerals (THERAPEUTIC MULTIVITAMIN-MINERALS) tablet 1 tablet, 2 TIMES DAILY    oxymetazoline (AFRIN) 0.05 % nasal spray 2 sprays, 2 TIMES DAILY    rosuvastatin (CRESTOR) 20 mg, NIGHTLY    Slow Release Iron 45 mg, 2 times daily    sulfamethoxazole-trimethoprim (BACTRIM DS;SEPTRA DS) 800-160 MG per tablet 1 tablet, Oral, 2 TIMES DAILY    traZODone (DESYREL) 50 mg, NIGHTLY    Xarelto 10 mg      Past Medical/Surgical History   No past medical history on file.  No past surgical history on file.   Family and Social History   No family history on file.  Social History     Socioeconomic History    Marital status: Single     Spouse name: Not on file    Number of children: Not on file    Years of education: Not on file    Highest education level: Not on file   Occupational History    Not on file   Tobacco Use    Smoking status: Former     Types: Cigarettes    Smokeless tobacco: Not on file   Substance and

## 2025-03-26 NOTE — PROGRESS NOTES
-- Spoke with Dr. Law after being contacted by him that the patient has cord compression on MRI.  I recommend that he contact neurosurgery at [facility] immediately to evaluate for emergent transfer for surgery as this is a visveral neurologic crisis putting patient at risk of paralysis and even death.    Christian Mast MD

## 2025-03-27 NOTE — FLOWSHEET NOTE
Consult placed for eval/ transfer to ICU. Update and handoff provided to intensivist. Intensivist to speak w/ SM and JW about transfer as [facility] is no longer an option. Update provided to ortho surgery, Dr. Ball. NPO. Start q 1 neuro checks. Monitor for worsening neurologic status.

## 2025-03-27 NOTE — CONSULTS
[facility] [place] at [facility]  [address], [address] [facility] [address]  W: [phone]  F: [phone] Patient ID  Name: Edil Wong  YOB: 1945  MRN: [id number]  Referring Provider:   No referring provider defined for this encounter.  Primary Care Provider:   None, None       HEMATOLOGY/MEDICAL ONCOLOGY  NOTE     Reason for Evaluation:     Chief Complaint   Patient presents with    Urinary Incontinence    Urinary Frequency       Subjective:     History of Present Illness:   Date of Encounter: 3/23/2025     Edil Wong is a 79 y.o. male who presents as an inpatient consultation for possible cord compression secondary due spinal bone lesion(s).  Patient with a reported history of prostate cancer treated with prostatectomy and radiation.  Patient reportedly had issues with urination.  Even had difficulty making urine.  Also reports that he had some loose stool on 1 event.  However nothing persistent.    No past medical history on file.   No past surgical history on file.   Social History     Tobacco Use    Smoking status: Former     Types: Cigarettes    Smokeless tobacco: Not on file   Substance Use Topics    Alcohol use: Not on file      No family history on file.  Current Facility-Administered Medications   Medication Dose Route Frequency    dexAMETHasone (PF) (DECADRON) injection 10 mg  10 mg IntraVENous Q6H    ciprofloxacin (CIPRO) tablet 500 mg  500 mg Oral BID    sodium chloride flush 0.9 % injection 5-40 mL  5-40 mL IntraVENous 2 times per day    sodium chloride flush 0.9 % injection 5-40 mL  5-40 mL IntraVENous PRN    0.9 % sodium chloride infusion   IntraVENous PRN    ondansetron (ZOFRAN-ODT) disintegrating tablet 4 mg  4 mg Oral Q8H PRN    Or    ondansetron (ZOFRAN) injection 4 mg  4 mg IntraVENous Q6H PRN    polyethylene glycol (GLYCOLAX) packet 17 g  17 g Oral Daily PRN    acetaminophen (TYLENOL) tablet 650 mg  650 mg Oral Q6H PRN    Or     normal. normal affect normal speech rate.    Results:      I personally reviewed Epic EHR labs/results below:    Lab Results   Component Value Date    WBC 5.4 03/25/2025    HGB 8.1 (L) 03/25/2025    HCT 25.6 (L) 03/25/2025     03/25/2025    MCV 93.4 03/25/2025    NEUTROABS 4.06 03/25/2025     Lab Results   Component Value Date     03/25/2025    K 3.9 03/25/2025     (H) 03/25/2025    CO2 24 03/25/2025    GLUCOSE 93 03/25/2025    BUN 15 03/25/2025    CREATININE 0.79 03/25/2025    LABGLOM 89 03/25/2025    CALCIUM 8.6 03/25/2025    MG 2.1 03/24/2025     Lab Results   Component Value Date    BILITOT 0.6 03/23/2025    ALT 38 03/23/2025    AST 37 03/23/2025    ALKPHOS 104 03/23/2025    GLOB 4.3 (H) 03/23/2025        Assessment and Recommendations:     Weakness of both lower extremities  -- Patient seen this late afternoon/early evening after his MRI which was pending at the time of visit. Seen along with Radiation Oncology. As per other note contacted by Dr. Law this evening about cord compression on MRI. He has already ordered Dexamethasone 10mg loading then dexamethasone 4mg every 6 hours.  Recommended that he contact neurosurgery immediately about need for emergent transfer due to the risk of paralysis.    Urinary retention  -- Could be seen with cord compression.  Urology following.    History of prostate cancer  -- PSA ordered and pending.  Review of notes from I details pending workup by patient's primary oncologist Dr. Diaz.    H/O multiple myeloma  -- Currently has been evaluated by patient's primary oncologist.  Need exact records about the overall treatment plan.  This patient is somewhat limited historian about specifics.      FOLLOW-UP:  Will follow while here; if being transferred then recommend hematology/oncology consutlation    Signed By:   Christian Mast MD

## 2025-03-27 NOTE — DISCHARGE SUMMARY
[facility]                                                                                         Discharge Summary     Patient: Edil Wong       MRN: [id number]       YOB: 1945       Age: 79 y.o.     Date of admission:  3/23/2025    Date of discharge:  3/26/2025    Primary care provider:  None, None     Admitting provider:  Khurram Law MD    Discharging provider(s): NANCY RAMIREZ PA-C - Staff Intensivist - [facility]     Consultations  IP CONSULT TO UROLOGY  IP CONSULT TO CASE MANAGEMENT  IP CONSULT TO NEUROLOGY  IP CONSULT TO ORTHOPEDIC SURGERY  IP CONSULT TO ONCOLOGY  IP CONSULT TO RADIATION ONCOLOGY  IP CONSULT TO NEUROSURGERY  IP CONSULT TO INTENSIVIST      Discharge destination: Transfer to Critical access hospital  The patient is stable for discharge.    Admission diagnosis  Urinary retention [R33.9]  UTI (urinary tract infection) [N39.0]  General weakness [R53.1]  Urinary tract infection with hematuria, site unspecified [N39.0, R31.9]    Please refer to the admission history and physical for details on the presenting problem.     Final discharge diagnoses and brief hospital course    Mr. Wong is a 80 yo male with PMH of HTN, XOL admitted with recurrent hematuria and weakness     Weakness  -Worsening.  Lower extremity strength profound. 1-2/5 strength lower extremity muscle groups.  Paresthesias reported mid thigh down.  MRI with mass and cord compression at T2 level.  Cord signal changes T1-T3.  Oncology recommending emergent transfer for neurosurgery evaluation.  Discussed with family who prefers [facility] for continuity of care with oncology.  Discussed case with on-call neurosurgery for [facility] Dr. Greer.  Per Dr.Geckle smyth for transfer to [facility].  Accepted by intensivist for transfer to neuro ICU at [facility].    Hematuria - resolved   -monitor with tesfaye in place   -holding NOAC (this was stopped in Dec per

## 2025-03-27 NOTE — PROGRESS NOTES
2246: Report given to  Neuro ICU nurse Percy Moreland RN. Awaiting transport.     2315: Transport moving pt

## 2025-03-27 NOTE — CONSULTS
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  [facility] on transfer restriction.  Family requests [facility].  Case discussed with on-call neurosurgeon as well as intensivist at [facility].  Plan for urgent transfer.   - Patient reporting some subjective improvement in lower extremity weakness after steroids.  However remains profoundly weak with 1-2 out of 5 strength.  Reportedly had episode of incontinent stool earlier today.  Having issues with urinary retention which could be related to cord compression.   - Patient was transferred to the ICU briefly for close neuromonitoring prior to transfer plans being finalized.   - Continue steroids    CURRENT ANTIMICROBIALS:   Inpat Anti-Infectives (From admission, onward)       Start     Ordered Stop    25 1200  ciprofloxacin (CIPRO) tablet 500 mg  500 mg,   Oral,   2 times daily         25 1143 25 0859    25 2100  acyclovir (ZOVIRAX) capsule 200 mg  200 mg,   Oral,   2 TIMES DAILY         25 1540 --    25 0000  sulfamethoxazole-trimethoprim (BACTRIM DS;SEPTRA DS) 800-160 MG per tablet  1 tablet,   Oral,   2 TIMES DAILY         25 1421 25 2359                    CULTURES TO DATE:   Results       Procedure Component Value Units Date/Time    Urine Culture Hold Sample [[id number]] Collected: 25 1156    Order Status: Completed Specimen: Urine Updated: 25 1201     Specimen HOld       Urine on hold in Microbiology dept for 2 days.  If unpreserved urine is submitted, it cannot be used for addtional testing after 24 hours, recollection will be required.                  HISTORY OF PRESENT ILLNESS:     This is an 79-year-old male with history of prostate cancer, multiple myeloma.  He  (ALLEGRA) 180 MG tablet Take 1 tablet by mouth daily as needed   Yes Brian Parker MD   guaiFENesin (MUCINEX) 600 MG extended release tablet Take 2 tablets by mouth 2 times daily   Yes Provider, Historical, MD   Elderberry-Vitamin C-Zinc (ELDERBERRY IMMUNE HEALTH GUMMY PO) Take 1 gum by mouth daily   Yes Brian Parker MD   famotidine (PEPCID) 20 MG tablet Take 1 tablet by mouth daily   Yes Brian Parker MD   lisinopril (PRINIVIL;ZESTRIL) 20 MG tablet Take 1 tablet by mouth daily   Yes Brian Parker MD   gabapentin (NEURONTIN) 300 MG capsule Take 1 capsule by mouth 3 times daily.   Yes Brian Parker MD   acyclovir (ZOVIRAX) 200 MG capsule Take 1 capsule by mouth in the morning and 1 capsule in the evening.   Yes Brian Parker MD   denosumab (XGEVA) 120 MG/1.7ML SOLN SC injection Inject 1.7 mLs into the skin once   Yes Brian Parker MD   rivaroxaban (XARELTO) 10 MG TABS tablet Take 1 tablet by mouth  Patient not taking: Reported on 3/23/2025    Brian Parker MD   fluticasone (VERAMYST) 27.5 MCG/SPRAY nasal spray 2 sprays by Each Nostril route daily  Patient not taking: Reported on 3/23/2025    Brian Parker MD   traZODone (DESYREL) 50 MG tablet Take 1 tablet by mouth nightly  Patient not taking: Reported on 3/23/2025    Brian Parker MD   acetaminophen (TYLENOL) 500 MG tablet Take 2 tablets by mouth every 6-8 hours as needed for Pain 2/7/24 3/8/24  Sima Rhoades PA-C   ibuprofen (ADVIL;MOTRIN) 800 MG tablet Take 1 tablet by mouth every 8 hours as needed for Pain 2/7/24 3/8/24  Sima Rhoades PA-C       Allergies/Social/Family History:     No Known Allergies   Social History     Tobacco Use    Smoking status: Former     Types: Cigarettes    Smokeless tobacco: Not on file   Substance Use Topics    Alcohol use: Not on file      No family history on file.      OBJECTIVE:     Labs and Data: Reviewed 03/26/25  Medications: Reviewed

## 2025-04-22 PROBLEM — N39.0 UTI (URINARY TRACT INFECTION): Status: RESOLVED | Noted: 2025-03-23 | Resolved: 2025-04-22

## 2025-05-27 ENCOUNTER — HOSPITAL ENCOUNTER (OUTPATIENT)
Facility: HOSPITAL | Age: 80
Discharge: HOME | End: 2025-05-30
Attending: INTERNAL MEDICINE
Payer: MEDICARE

## 2025-05-27 VITALS
HEART RATE: 74 BPM | OXYGEN SATURATION: 98 % | DIASTOLIC BLOOD PRESSURE: 63 MMHG | RESPIRATION RATE: 16 BRPM | SYSTOLIC BLOOD PRESSURE: 121 MMHG

## 2025-05-27 DIAGNOSIS — C90.30: ICD-10-CM

## 2025-05-27 DIAGNOSIS — C90.00 MULTIPLE MYELOMA NOT HAVING ACHIEVED REMISSION (HCC): ICD-10-CM

## 2025-05-27 LAB
BASOPHILS # BLD: 0.02 K/UL (ref 0–0.1)
BASOPHILS NFR BLD: 0.5 % (ref 0–1)
DIFFERENTIAL METHOD BLD: ABNORMAL
EOSINOPHIL # BLD: 0.16 K/UL (ref 0–0.4)
EOSINOPHIL NFR BLD: 4 % (ref 0–7)
ERYTHROCYTE [DISTWIDTH] IN BLOOD BY AUTOMATED COUNT: 16.5 % (ref 11.5–14.5)
HCT VFR BLD AUTO: 33.7 % (ref 36.6–50.3)
HGB BLD-MCNC: 11.3 G/DL (ref 12.1–17)
IMM GRANULOCYTES # BLD AUTO: 0.06 K/UL (ref 0–0.04)
IMM GRANULOCYTES NFR BLD AUTO: 1.5 % (ref 0–0.5)
LYMPHOCYTES # BLD: 0.3 K/UL (ref 0.8–3.5)
LYMPHOCYTES NFR BLD: 7.4 % (ref 12–49)
MCH RBC QN AUTO: 30.5 PG (ref 26–34)
MCHC RBC AUTO-ENTMCNC: 33.5 G/DL (ref 30–36.5)
MCV RBC AUTO: 91.1 FL (ref 80–99)
MONOCYTES # BLD: 0.32 K/UL (ref 0–1)
MONOCYTES NFR BLD: 7.7 % (ref 5–13)
NEUTS SEG # BLD: 3.24 K/UL (ref 1.8–8)
NEUTS SEG NFR BLD: 78.9 % (ref 32–75)
NRBC # BLD: 0 K/UL (ref 0–0.01)
NRBC BLD-RTO: 0 PER 100 WBC
PLATELET # BLD AUTO: 149 K/UL (ref 150–400)
PMV BLD AUTO: 8.3 FL (ref 8.9–12.9)
RBC # BLD AUTO: 3.7 M/UL (ref 4.1–5.7)
RBC MORPH BLD: ABNORMAL
WBC # BLD AUTO: 4.1 K/UL (ref 4.1–11.1)

## 2025-05-27 PROCEDURE — 88365 INSITU HYBRIDIZATION (FISH): CPT

## 2025-05-27 PROCEDURE — 99153 MOD SED SAME PHYS/QHP EA: CPT

## 2025-05-27 PROCEDURE — 88313 SPECIAL STAINS GROUP 2: CPT

## 2025-05-27 PROCEDURE — 88360 TUMOR IMMUNOHISTOCHEM/MANUAL: CPT

## 2025-05-27 PROCEDURE — 88364 INSITU HYBRIDIZATION (FISH): CPT

## 2025-05-27 PROCEDURE — C1830 POWER BONE MARROW BX NEEDLE: HCPCS

## 2025-05-27 PROCEDURE — 85025 COMPLETE CBC W/AUTO DIFF WBC: CPT

## 2025-05-27 PROCEDURE — 88341 IMHCHEM/IMCYTCHM EA ADD ANTB: CPT

## 2025-05-27 PROCEDURE — 88311 DECALCIFY TISSUE: CPT

## 2025-05-27 PROCEDURE — 36415 COLL VENOUS BLD VENIPUNCTURE: CPT

## 2025-05-27 PROCEDURE — 88305 TISSUE EXAM BY PATHOLOGIST: CPT

## 2025-05-27 RX ORDER — MIDAZOLAM HYDROCHLORIDE 1 MG/ML
5 INJECTION, SOLUTION INTRAMUSCULAR; INTRAVENOUS AS NEEDED
Status: DISCONTINUED | OUTPATIENT
Start: 2025-05-27 | End: 2025-05-27

## 2025-05-27 RX ORDER — FENTANYL CITRATE 50 UG/ML
100 INJECTION, SOLUTION INTRAMUSCULAR; INTRAVENOUS AS NEEDED
Refills: 0 | Status: DISCONTINUED | OUTPATIENT
Start: 2025-05-27 | End: 2025-05-27

## 2025-05-27 NOTE — PROGRESS NOTES
Patient to radiology holding from waiting room    Waiting for the patient is [place] , who can be reached at [phone]    ID verified using two patient identifiers, as well as NPO status, patient is alert and oriented x 4.    Lungs sounds auscultated, clear bilaterally.  Patient is normal sinus rhythm on the cardiac monitor.  Patient is afebrile, and pain is 0 out of 10 on the numeric scale.        IV placed in the right wrist 22 gauge is patent and has positive blood return.  Labs sent to include cbc with differential and peripheral bone smear    Kayla Browne PA-C called to bedside to consent the patient at 1037, opportunity for questions provided.  Sedation plans discussed with provider.      Transported patient to CT department at 1055 to prepare for CT guided bone marrow biopsy with conscious sedation    Staff members present:  Cloud CT tech  [facility]  Fazal Browne PA-C    Time out performed at 1112    Procedure start time 1112    Patient monitored throughout the procedure, to include ETCO2, cardiac, blood pressure, O2 saturation.  Vitals remained stable throughout the procedure.    Patient received a total of 75 mcg fentanyl and 3 mg versed intra-procedure    Dressing applied to left iliac crest by Kayla , dressing is clean, dry, and intact.    Procedure end time 1120    Patient tolerated procedure well    Returned to radiology holding at 1130    Patient tolerating PO    Family member updated with plan of care.    Patient given printed discharge paperwork. Discharge teaching provided.  Verbalized understanding. Final discharge time is 1230 to the care of family    Rosibel Diehl RN

## 2025-05-27 NOTE — H&P
tingling or swelling. The remainder of the review of systems is negative for any additional contributing elements.        Physical Exam:  Blood pressure 133/76, pulse 67, resp. rate 14, SpO2 97%.  General:  Calm, cooperative, NAD  HEENT:  NCAT, EOMI, conjunctiva clear, MMM  Heart:  RRR, S1S2 normal  Lungs:  CTAB, NWOB  Abdomen:  Soft, NT, ND  Extremities:  MAEW, no cyanosis or edema  Skin:  Warm and dry, color normal, no rashes  Neurological:  AAOX3, speech clear and coherent    Mallampati Airway Assessment: II (soft palate, uvula, fauces visible)    ASA Classification: ASA 3 - Patient with moderate systemic disease with functional limitations    Laboratory:      Recent Labs     05/27/25  0942   HGB 11.3*   HCT 33.7*   WBC 4.1   *       Imaging:  All appropriate imaging has been reviewed by the radiologist.    Impression/Plan:  Patient has been evaluated and deemed an appropriate candidate for intravenous sedation. Based on history and presentation he is a candidate for CT-guided bone marrow biopsy.     The above procedure was explained to the patient/consenting party. Benefits, risks and alternative therapies reviewed and all questions answered to his satisfaction. At this time he wishes to proceed.     Please note that Dr. Edilma Diego participated in the provision of these services. We appreciate the kind consultation and the opportunity to participate in Edil Wong's care.        Kayla Browne PA-C  Interventional Radiology  [facility]  [facility]  [phone]  [facility] [phone]  [facility] [phone]  [facility] [phone]      CC: Ericka Diaz MD   none

## 2025-07-31 ENCOUNTER — HOSPITAL ENCOUNTER (EMERGENCY)
Facility: HOSPITAL | Age: 80
Discharge: NURSING, REHAB OR LONG-TERM CARE | End: 2025-08-01
Attending: EMERGENCY MEDICINE
Payer: MEDICARE

## 2025-07-31 DIAGNOSIS — D64.9 ACUTE ON CHRONIC ANEMIA: ICD-10-CM

## 2025-07-31 DIAGNOSIS — T83.9XXA FOLEY CATHETER PROBLEM, INITIAL ENCOUNTER: ICD-10-CM

## 2025-07-31 DIAGNOSIS — N48.1 BALANITIS: ICD-10-CM

## 2025-07-31 DIAGNOSIS — R33.9 ACUTE ON CHRONIC URINARY RETENTION: Primary | ICD-10-CM

## 2025-07-31 LAB
ALBUMIN SERPL-MCNC: 2.6 G/DL (ref 3.5–5)
ALBUMIN/GLOB SERPL: 0.7 (ref 1.1–2.2)
ALP SERPL-CCNC: 118 U/L (ref 45–117)
ALT SERPL-CCNC: 16 U/L (ref 12–78)
ANION GAP SERPL CALC-SCNC: 6 MMOL/L (ref 2–12)
APTT PPP: 28.3 SEC (ref 22.1–31)
AST SERPL-CCNC: 16 U/L (ref 15–37)
BASOPHILS # BLD: 0.03 K/UL (ref 0–0.1)
BASOPHILS NFR BLD: 0.6 % (ref 0–1)
BILIRUB SERPL-MCNC: 0.3 MG/DL (ref 0.2–1)
BUN SERPL-MCNC: 14 MG/DL (ref 6–20)
BUN/CREAT SERPL: 20 (ref 12–20)
CALCIUM SERPL-MCNC: 9.2 MG/DL (ref 8.5–10.1)
CHLORIDE SERPL-SCNC: 103 MMOL/L (ref 97–108)
CO2 SERPL-SCNC: 30 MMOL/L (ref 21–32)
CREAT SERPL-MCNC: 0.69 MG/DL (ref 0.7–1.3)
DIFFERENTIAL METHOD BLD: ABNORMAL
EOSINOPHIL # BLD: 0.14 K/UL (ref 0–0.4)
EOSINOPHIL NFR BLD: 2.9 % (ref 0–7)
ERYTHROCYTE [DISTWIDTH] IN BLOOD BY AUTOMATED COUNT: 15.6 % (ref 11.5–14.5)
GLOBULIN SER CALC-MCNC: 3.5 G/DL (ref 2–4)
GLUCOSE SERPL-MCNC: 97 MG/DL (ref 65–100)
HCT VFR BLD AUTO: 26.7 % (ref 36.6–50.3)
HGB BLD-MCNC: 8.5 G/DL (ref 12.1–17)
IMM GRANULOCYTES # BLD AUTO: 0.04 K/UL (ref 0–0.04)
IMM GRANULOCYTES NFR BLD AUTO: 0.8 % (ref 0–0.5)
INR PPP: 1.1 (ref 0.9–1.1)
LYMPHOCYTES # BLD: 0.43 K/UL (ref 0.8–3.5)
LYMPHOCYTES NFR BLD: 8.8 % (ref 12–49)
MCH RBC QN AUTO: 28.8 PG (ref 26–34)
MCHC RBC AUTO-ENTMCNC: 31.8 G/DL (ref 30–36.5)
MCV RBC AUTO: 90.5 FL (ref 80–99)
MONOCYTES # BLD: 0.49 K/UL (ref 0–1)
MONOCYTES NFR BLD: 10 % (ref 5–13)
NEUTS SEG # BLD: 3.77 K/UL (ref 1.8–8)
NEUTS SEG NFR BLD: 76.9 % (ref 32–75)
NRBC # BLD: 0 K/UL (ref 0–0.01)
NRBC BLD-RTO: 0 PER 100 WBC
PLATELET # BLD AUTO: 298 K/UL (ref 150–400)
PMV BLD AUTO: 8.6 FL (ref 8.9–12.9)
POTASSIUM SERPL-SCNC: 3.4 MMOL/L (ref 3.5–5.1)
PROT SERPL-MCNC: 6.1 G/DL (ref 6.4–8.2)
PROTHROMBIN TIME: 11.1 SEC (ref 9.2–11.2)
RBC # BLD AUTO: 2.95 M/UL (ref 4.1–5.7)
RBC MORPH BLD: ABNORMAL
SODIUM SERPL-SCNC: 139 MMOL/L (ref 136–145)
THERAPEUTIC RANGE: NORMAL SECS (ref 58–77)
WBC # BLD AUTO: 4.9 K/UL (ref 4.1–11.1)

## 2025-07-31 PROCEDURE — 99283 EMERGENCY DEPT VISIT LOW MDM: CPT

## 2025-07-31 PROCEDURE — 36415 COLL VENOUS BLD VENIPUNCTURE: CPT

## 2025-07-31 PROCEDURE — 51702 INSERT TEMP BLADDER CATH: CPT

## 2025-07-31 PROCEDURE — 80053 COMPREHEN METABOLIC PANEL: CPT

## 2025-07-31 PROCEDURE — 85730 THROMBOPLASTIN TIME PARTIAL: CPT

## 2025-07-31 PROCEDURE — 85610 PROTHROMBIN TIME: CPT

## 2025-07-31 PROCEDURE — 85025 COMPLETE CBC W/AUTO DIFF WBC: CPT

## 2025-07-31 PROCEDURE — 6370000000 HC RX 637 (ALT 250 FOR IP): Performed by: EMERGENCY MEDICINE

## 2025-07-31 RX ORDER — MICONAZOLE NITRATE 2 G/100G
CREAM TOPICAL 2 TIMES DAILY
Status: DISCONTINUED | OUTPATIENT
Start: 2025-07-31 | End: 2025-08-01 | Stop reason: HOSPADM

## 2025-07-31 RX ADMIN — MICONAZOLE NITRATE: 20 CREAM TOPICAL at 23:19

## 2025-07-31 ASSESSMENT — PAIN - FUNCTIONAL ASSESSMENT: PAIN_FUNCTIONAL_ASSESSMENT: NONE - DENIES PAIN

## 2025-08-01 VITALS
OXYGEN SATURATION: 98 % | DIASTOLIC BLOOD PRESSURE: 54 MMHG | TEMPERATURE: 97.9 F | SYSTOLIC BLOOD PRESSURE: 117 MMHG | RESPIRATION RATE: 15 BRPM | HEART RATE: 100 BPM

## 2025-08-01 RX ORDER — MICONAZOLE NITRATE 2 G/100G
CREAM TOPICAL
Qty: 1 EACH | Refills: 1 | Status: SHIPPED | OUTPATIENT
Start: 2025-08-01 | End: 2025-08-01

## 2025-08-01 RX ORDER — MICONAZOLE NITRATE 2 G/100G
CREAM TOPICAL
Qty: 1 EACH | Refills: 1 | Status: SHIPPED | OUTPATIENT
Start: 2025-08-01

## 2025-08-01 NOTE — ED PROVIDER NOTES
Balanitis    3. Carr catheter problem, initial encounter    4. Acute on chronic anemia          DISPOSITION/PLAN   DISPOSITION Decision To Discharge 08/01/2025 12:25:22 AM      PATIENT REFERRED TO:  No follow-up provider specified.    DISCHARGE MEDICATIONS:  New Prescriptions    MICONAZOLE (MICOTIN) 2 % CREAM    Apply topically 2 times daily.         (Please note that portions of this note were completed with a voice recognition program.  Efforts were made to edit the dictations but occasionally words are mis-transcribed.)    Willem Mittal MD (electronically signed)  Emergency Attending Physician / Physician Assistant / Nurse Practitioner            Willem Mittal MD  08/01/25 0032

## 2025-08-01 NOTE — ED NOTES
Pt states that he has had his current tesfaye for a month. Pt's old tesfaye removed per Dr. Mittal and a new one placed.

## 2025-08-01 NOTE — ED NOTES
Nurse was not able to physically speak to [facility] staff to given pt report and details of pt's transport back to their facility. A recorded message was left on the messaging machine and a follow up number to [facility] for any questions.

## 2025-08-01 NOTE — ED NOTES
The patient was discharged home by Dr. Mittal and emily Acevedo in stable condition The patient is alert and oriented, is in no respiratory distress. The patient's diagnosis, condition and treatment were explained to patient and staff of [facility]. The patient expressed understanding. Prescriptions given to pt. No work/school note given to pt. A discharge plan has been developed. A  was not involved in the process. Aftercare instructions were given to the patient. H2H at bedside for pt transport.

## 2025-08-01 NOTE — ED NOTES
Upon insertion of new tesfaye, urine was dark pink and was clearing as urine was being produced. No clots observed.